# Patient Record
Sex: FEMALE | Race: WHITE
[De-identification: names, ages, dates, MRNs, and addresses within clinical notes are randomized per-mention and may not be internally consistent; named-entity substitution may affect disease eponyms.]

---

## 2018-01-11 ENCOUNTER — HOSPITAL ENCOUNTER (OUTPATIENT)
Age: 63
End: 2018-01-11
Payer: MEDICARE

## 2018-01-11 DIAGNOSIS — D50.9: ICD-10-CM

## 2018-01-11 DIAGNOSIS — E78.5: Primary | ICD-10-CM

## 2018-01-11 DIAGNOSIS — N18.2: ICD-10-CM

## 2018-01-11 LAB
ALBUMIN LEVEL: 3.6 GM/DL (ref 3.4–5)
ALBUMIN/GLOB SERPL: 1.1 {RATIO} (ref 1.1–1.8)
ALP ISO SERPL-ACNC: 62 U/L (ref 46–116)
ALT SERPLBLD-CCNC: 67 U/L (ref 12–78)
ANION GAP SERPL CALC-SCNC: 12.5 MEQ/L (ref 5–15)
AST SERPL QL: 64 U/L (ref 15–37)
BILIRUBIN,TOTAL: 0.3 MG/DL (ref 0.2–1)
BUN SERPL-MCNC: 13 MG/DL (ref 7–18)
CALCIUM SPEC-MCNC: 8.8 MG/DL (ref 8.5–10.1)
CHLORIDE SPEC-SCNC: 108 MMOL/L (ref 98–107)
CHOLEST SPEC-SCNC: 234 MG/DL (ref 140–200)
CO2 SERPL-SCNC: 28 MMOL/L (ref 21–32)
CREAT BLD-SCNC: 0.96 MG/DL (ref 0.55–1.02)
ESTIMATED GLOMERULAR FILT RATE: 59 ML/MIN (ref 60–?)
GFR (AFRICAN AMERICAN): 71 ML/MIN (ref 60–?)
GLOBULIN SER CALC-MCNC: 3.3 GM/DL (ref 1.3–3.2)
GLUCOSE: 134 MG/DL (ref 74–106)
HCT VFR BLD CALC: 36.9 % (ref 37–47)
HDLC SERPL-MCNC: 47 MG/DL (ref 29–89)
HGB BLD-MCNC: 10.9 G/DL (ref 12.2–16.2)
MCHC RBC-ENTMCNC: 29.5 G/DL (ref 31.8–35.4)
MCV RBC: 81.8 FL (ref 81–99)
MEAN CORPUSCULAR HEMOGLOBIN: 24.1 PG (ref 27–31.2)
PLATELET # BLD: 238 K/MM3 (ref 142–424)
POTASSIUM: 4.5 MMOL/L (ref 3.5–5.1)
PROT SERPL-MCNC: 6.9 GM/DL (ref 6.4–8.2)
RBC # BLD AUTO: 4.51 M/MM3 (ref 4.2–5.4)
SODIUM SPEC-SCNC: 144 MMOL/L (ref 136–145)
TRIGLYCERIDES: 108 MG/DL (ref 30–200)
WBC # BLD AUTO: 3.7 K/MM3 (ref 4.8–10.8)

## 2018-01-11 PROCEDURE — 80061 LIPID PANEL: CPT

## 2018-01-11 PROCEDURE — 80053 COMPREHEN METABOLIC PANEL: CPT

## 2018-01-11 PROCEDURE — 85025 COMPLETE CBC W/AUTO DIFF WBC: CPT

## 2018-01-11 PROCEDURE — 36415 COLL VENOUS BLD VENIPUNCTURE: CPT

## 2018-02-02 ENCOUNTER — HOSPITAL ENCOUNTER (OUTPATIENT)
Dept: HOSPITAL 22 - ACC | Age: 63
Discharge: HOME | End: 2018-02-02
Payer: MEDICARE

## 2018-02-02 VITALS — BODY MASS INDEX: 34.7 KG/M2

## 2018-02-02 DIAGNOSIS — Z79.01: Primary | ICD-10-CM

## 2018-02-02 DIAGNOSIS — Z51.81: ICD-10-CM

## 2018-02-02 LAB
INR PPP: 13.53 (ref 0.9–1.1)
PT BLD: 150 SECONDS (ref 9.4–11.8)

## 2018-02-02 PROCEDURE — 96372 THER/PROPH/DIAG INJ SC/IM: CPT

## 2018-02-02 PROCEDURE — 85610 PROTHROMBIN TIME: CPT

## 2018-02-02 PROCEDURE — 36415 COLL VENOUS BLD VENIPUNCTURE: CPT

## 2018-02-05 ENCOUNTER — HOSPITAL ENCOUNTER (OUTPATIENT)
Dept: HOSPITAL 22 - ACC | Age: 63
Discharge: HOME | End: 2018-02-05
Payer: MEDICARE

## 2018-02-05 DIAGNOSIS — Z51.81: ICD-10-CM

## 2018-02-05 DIAGNOSIS — Z79.01: Primary | ICD-10-CM

## 2018-02-05 LAB — PHA INR FINGERSTICK: 1.6 (ref 0.9–1.1)

## 2018-02-05 PROCEDURE — 85610 PROTHROMBIN TIME: CPT

## 2018-02-05 PROCEDURE — 99211 OFF/OP EST MAY X REQ PHY/QHP: CPT

## 2018-02-05 PROCEDURE — G0463 HOSPITAL OUTPT CLINIC VISIT: HCPCS

## 2018-06-12 ENCOUNTER — HOSPITAL ENCOUNTER (OUTPATIENT)
Age: 63
End: 2018-06-12
Payer: MEDICARE

## 2018-06-12 DIAGNOSIS — I10: ICD-10-CM

## 2018-06-12 DIAGNOSIS — R40.0: ICD-10-CM

## 2018-06-12 DIAGNOSIS — G47.33: Primary | ICD-10-CM

## 2018-06-12 DIAGNOSIS — E66.9: ICD-10-CM

## 2018-06-12 PROCEDURE — 95810 POLYSOM 6/> YRS 4/> PARAM: CPT

## 2018-08-02 ENCOUNTER — HOSPITAL ENCOUNTER (OUTPATIENT)
Dept: HOSPITAL 22 - ACC | Age: 63
Discharge: HOME | End: 2018-08-02
Payer: MEDICARE

## 2018-08-02 DIAGNOSIS — Z51.81: ICD-10-CM

## 2018-08-02 DIAGNOSIS — Z79.01: Primary | ICD-10-CM

## 2018-08-02 LAB — PHA INR FINGERSTICK: 2.1 (ref 0.9–1.1)

## 2018-08-02 PROCEDURE — 85610 PROTHROMBIN TIME: CPT

## 2018-08-02 PROCEDURE — G0463 HOSPITAL OUTPT CLINIC VISIT: HCPCS

## 2018-08-02 PROCEDURE — 99211 OFF/OP EST MAY X REQ PHY/QHP: CPT

## 2018-08-29 ENCOUNTER — HOSPITAL ENCOUNTER (OUTPATIENT)
Dept: HOSPITAL 22 - ACC | Age: 63
Discharge: HOME | End: 2018-08-29
Payer: MEDICARE

## 2018-08-29 DIAGNOSIS — Z79.01: Primary | ICD-10-CM

## 2018-08-29 LAB — PHA INR FINGERSTICK: 2.2 (ref 0.9–1.1)

## 2018-08-29 PROCEDURE — G0463 HOSPITAL OUTPT CLINIC VISIT: HCPCS

## 2018-08-29 PROCEDURE — 85610 PROTHROMBIN TIME: CPT

## 2018-08-29 PROCEDURE — 99211 OFF/OP EST MAY X REQ PHY/QHP: CPT

## 2018-09-12 ENCOUNTER — HOSPITAL ENCOUNTER (OUTPATIENT)
Age: 63
End: 2018-09-12
Payer: MEDICARE

## 2018-09-12 DIAGNOSIS — E11.22: Primary | ICD-10-CM

## 2018-09-12 DIAGNOSIS — E53.8: ICD-10-CM

## 2018-09-12 DIAGNOSIS — D50.9: ICD-10-CM

## 2018-09-12 DIAGNOSIS — E78.5: ICD-10-CM

## 2018-09-12 DIAGNOSIS — N18.2: ICD-10-CM

## 2018-09-12 LAB
ALBUMIN LEVEL: 3.6 GM/DL (ref 3.4–5)
ALBUMIN/GLOB SERPL: 1.1 {RATIO} (ref 1.1–1.8)
ALP ISO SERPL-ACNC: 73 U/L (ref 46–116)
ALT SERPLBLD-CCNC: 73 U/L (ref 12–78)
ANION GAP SERPL CALC-SCNC: 13.7 MEQ/L (ref 5–15)
AST SERPL QL: 63 U/L (ref 15–37)
BILIRUBIN,TOTAL: 0.2 MG/DL (ref 0.2–1)
BUN SERPL-MCNC: 12 MG/DL (ref 7–18)
CALCIUM SPEC-MCNC: 9 MG/DL (ref 8.5–10.1)
CHLORIDE SPEC-SCNC: 109 MMOL/L (ref 98–107)
CHOLEST SPEC-SCNC: 225 MG/DL (ref 140–200)
CO2 SERPL-SCNC: 26 MMOL/L (ref 21–32)
CREAT BLD-SCNC: 1.09 MG/DL (ref 0.55–1.02)
ESTIMATED GLOMERULAR FILT RATE: 51 ML/MIN (ref 60–?)
FERRITIN SERPL-MCNC: 17 NG/ML (ref 8–388)
GFR (AFRICAN AMERICAN): 61 ML/MIN (ref 60–?)
GLOBULIN SER CALC-MCNC: 3.4 GM/DL (ref 1.3–3.2)
GLUCOSE: 162 MG/DL (ref 74–106)
HBA1C MFR BLD: 7.9 % (ref 0–7)
HCT VFR BLD CALC: 37.5 % (ref 37–47)
HDLC SERPL-MCNC: 43 MG/DL (ref 29–89)
HGB BLD-MCNC: 11.5 G/DL (ref 12.2–16.2)
MCHC RBC-ENTMCNC: 30.5 G/DL (ref 31.8–35.4)
MCV RBC: 80.9 FL (ref 81–99)
MEAN CORPUSCULAR HEMOGLOBIN: 24.7 PG (ref 27–31.2)
PLATELET # BLD: 247 K/MM3 (ref 142–424)
POTASSIUM: 4.7 MMOL/L (ref 3.5–5.1)
PROT SERPL-MCNC: 7 GM/DL (ref 6.4–8.2)
RBC # BLD AUTO: 4.64 M/MM3 (ref 4.2–5.4)
SODIUM SPEC-SCNC: 144 MMOL/L (ref 136–145)
TRIGLYCERIDES: 129 MG/DL (ref 30–200)
WBC # BLD AUTO: 4.2 K/MM3 (ref 4.8–10.8)

## 2018-09-12 PROCEDURE — 82652 VIT D 1 25-DIHYDROXY: CPT

## 2018-09-12 PROCEDURE — 82570 ASSAY OF URINE CREATININE: CPT

## 2018-09-12 PROCEDURE — 85025 COMPLETE CBC W/AUTO DIFF WBC: CPT

## 2018-09-12 PROCEDURE — 82043 UR ALBUMIN QUANTITATIVE: CPT

## 2018-09-12 PROCEDURE — 82607 VITAMIN B-12: CPT

## 2018-09-12 PROCEDURE — 83036 HEMOGLOBIN GLYCOSYLATED A1C: CPT

## 2018-09-12 PROCEDURE — 80053 COMPREHEN METABOLIC PANEL: CPT

## 2018-09-12 PROCEDURE — 36415 COLL VENOUS BLD VENIPUNCTURE: CPT

## 2018-09-12 PROCEDURE — 82728 ASSAY OF FERRITIN: CPT

## 2018-09-12 PROCEDURE — 80061 LIPID PANEL: CPT

## 2018-09-14 LAB — VITAMIN B12: 746 PG/ML (ref 232–1245)

## 2018-09-18 ENCOUNTER — HOSPITAL ENCOUNTER (OUTPATIENT)
Age: 63
End: 2018-09-18
Payer: MEDICARE

## 2018-09-18 DIAGNOSIS — Z78.0: ICD-10-CM

## 2018-09-18 DIAGNOSIS — J45.40: ICD-10-CM

## 2018-09-18 DIAGNOSIS — Z12.31: ICD-10-CM

## 2018-09-18 DIAGNOSIS — R13.10: ICD-10-CM

## 2018-09-18 DIAGNOSIS — Z13.820: Primary | ICD-10-CM

## 2018-09-18 PROCEDURE — 77080 DXA BONE DENSITY AXIAL: CPT

## 2018-09-18 PROCEDURE — 74247: CPT

## 2018-10-01 ENCOUNTER — HOSPITAL ENCOUNTER (OUTPATIENT)
Dept: HOSPITAL 22 - RAD | Age: 63
Discharge: HOME | End: 2018-10-01
Payer: MEDICARE

## 2018-10-01 DIAGNOSIS — Z51.81: ICD-10-CM

## 2018-10-01 DIAGNOSIS — Z12.31: Primary | ICD-10-CM

## 2018-10-01 DIAGNOSIS — Z79.01: ICD-10-CM

## 2018-10-01 LAB — PHA INR FINGERSTICK: 2.3 (ref 0.9–1.1)

## 2018-10-01 PROCEDURE — 77067 SCR MAMMO BI INCL CAD: CPT

## 2018-10-01 PROCEDURE — 85610 PROTHROMBIN TIME: CPT

## 2018-10-01 PROCEDURE — G0463 HOSPITAL OUTPT CLINIC VISIT: HCPCS

## 2018-10-01 PROCEDURE — 99211 OFF/OP EST MAY X REQ PHY/QHP: CPT

## 2018-10-29 ENCOUNTER — HOSPITAL ENCOUNTER (OUTPATIENT)
Dept: HOSPITAL 22 - ACC | Age: 63
Discharge: HOME | End: 2018-10-29
Payer: MEDICARE

## 2018-10-29 DIAGNOSIS — Z79.01: ICD-10-CM

## 2018-10-29 DIAGNOSIS — Z86.718: ICD-10-CM

## 2018-10-29 DIAGNOSIS — Z51.81: Primary | ICD-10-CM

## 2018-10-29 LAB — PHA INR FINGERSTICK: 2 (ref 0.9–1.1)

## 2018-10-29 PROCEDURE — 99211 OFF/OP EST MAY X REQ PHY/QHP: CPT

## 2018-10-29 PROCEDURE — G0463 HOSPITAL OUTPT CLINIC VISIT: HCPCS

## 2018-10-29 PROCEDURE — 85610 PROTHROMBIN TIME: CPT

## 2018-12-10 ENCOUNTER — HOSPITAL ENCOUNTER (OUTPATIENT)
Dept: HOSPITAL 22 - ACC | Age: 63
Discharge: HOME | End: 2018-12-10
Payer: MEDICARE

## 2018-12-10 DIAGNOSIS — I82.409: ICD-10-CM

## 2018-12-10 DIAGNOSIS — Z79.01: ICD-10-CM

## 2018-12-10 DIAGNOSIS — Z51.81: Primary | ICD-10-CM

## 2018-12-10 LAB — PHA INR FINGERSTICK: 1.9 (ref 0.9–1.1)

## 2018-12-10 PROCEDURE — 85610 PROTHROMBIN TIME: CPT

## 2018-12-10 PROCEDURE — G0463 HOSPITAL OUTPT CLINIC VISIT: HCPCS

## 2018-12-10 PROCEDURE — 99211 OFF/OP EST MAY X REQ PHY/QHP: CPT

## 2018-12-18 ENCOUNTER — HOSPITAL ENCOUNTER (OUTPATIENT)
Age: 63
End: 2018-12-18
Payer: MEDICARE

## 2018-12-18 DIAGNOSIS — J40: Primary | ICD-10-CM

## 2018-12-18 PROCEDURE — 71046 X-RAY EXAM CHEST 2 VIEWS: CPT

## 2019-01-07 ENCOUNTER — HOSPITAL ENCOUNTER (OUTPATIENT)
Age: 64
End: 2019-01-07
Payer: MEDICARE

## 2019-01-07 DIAGNOSIS — Z51.81: Primary | ICD-10-CM

## 2019-01-07 DIAGNOSIS — D50.9: ICD-10-CM

## 2019-01-07 DIAGNOSIS — I82.403: ICD-10-CM

## 2019-01-07 DIAGNOSIS — E55.9: ICD-10-CM

## 2019-01-07 DIAGNOSIS — Z79.01: ICD-10-CM

## 2019-01-07 DIAGNOSIS — E11.22: ICD-10-CM

## 2019-01-07 DIAGNOSIS — E53.8: ICD-10-CM

## 2019-01-07 LAB
ALBUMIN LEVEL: 3.7 GM/DL (ref 3.4–5)
ALBUMIN/GLOB SERPL: 1 {RATIO} (ref 1.1–1.8)
ALP ISO SERPL-ACNC: 66 U/L (ref 46–116)
ALT SERPLBLD-CCNC: 57 U/L (ref 12–78)
ANION GAP SERPL CALC-SCNC: 15.1 MEQ/L (ref 5–15)
AST SERPL QL: 46 U/L (ref 15–37)
BILIRUBIN,TOTAL: 0.3 MG/DL (ref 0.2–1)
BUN SERPL-MCNC: 19 MG/DL (ref 7–18)
CALCIUM SPEC-MCNC: 9.1 MG/DL (ref 8.5–10.1)
CHLORIDE SPEC-SCNC: 103 MMOL/L (ref 98–107)
CO2 SERPL-SCNC: 28 MMOL/L (ref 21–32)
CREAT BLD-SCNC: 1.02 MG/DL (ref 0.55–1.02)
ESTIMATED GLOMERULAR FILT RATE: 55 ML/MIN (ref 60–?)
FERRITIN SERPL-MCNC: 20 NG/ML (ref 8–388)
GFR (AFRICAN AMERICAN): 66 ML/MIN (ref 60–?)
GLOBULIN SER CALC-MCNC: 3.6 GM/DL (ref 1.3–3.2)
GLUCOSE: 90 MG/DL (ref 74–106)
HBA1C MFR BLD: 7.3 % (ref 0–7)
HCT VFR BLD CALC: 39.8 % (ref 37–47)
HGB BLD-MCNC: 12.2 G/DL (ref 12.2–16.2)
INR PPP: 1.37 (ref 0.9–1.1)
MCHC RBC-ENTMCNC: 30.7 G/DL (ref 31.8–35.4)
MCV RBC: 83 FL (ref 81–99)
MEAN CORPUSCULAR HEMOGLOBIN: 25.5 PG (ref 27–31.2)
PLATELET # BLD: 265 K/MM3 (ref 142–424)
POTASSIUM: 5.1 MMOL/L (ref 3.5–5.1)
PROT SERPL-MCNC: 7.3 GM/DL (ref 6.4–8.2)
PT BLD: 14 SECONDS (ref 9.4–11.8)
RBC # BLD AUTO: 4.79 M/MM3 (ref 4.2–5.4)
SODIUM SPEC-SCNC: 141 MMOL/L (ref 136–145)
WBC # BLD AUTO: 6.2 K/MM3 (ref 4.8–10.8)

## 2019-01-07 PROCEDURE — 82043 UR ALBUMIN QUANTITATIVE: CPT

## 2019-01-07 PROCEDURE — 85610 PROTHROMBIN TIME: CPT

## 2019-01-07 PROCEDURE — 80053 COMPREHEN METABOLIC PANEL: CPT

## 2019-01-07 PROCEDURE — 85025 COMPLETE CBC W/AUTO DIFF WBC: CPT

## 2019-01-07 PROCEDURE — 83036 HEMOGLOBIN GLYCOSYLATED A1C: CPT

## 2019-01-07 PROCEDURE — 82607 VITAMIN B-12: CPT

## 2019-01-07 PROCEDURE — 36415 COLL VENOUS BLD VENIPUNCTURE: CPT

## 2019-01-07 PROCEDURE — 82570 ASSAY OF URINE CREATININE: CPT

## 2019-01-07 PROCEDURE — 82728 ASSAY OF FERRITIN: CPT

## 2019-01-07 PROCEDURE — 82652 VIT D 1 25-DIHYDROXY: CPT

## 2019-01-08 LAB — VITAMIN B12: 1172 PG/ML (ref 232–1245)

## 2019-01-21 ENCOUNTER — HOSPITAL ENCOUNTER (OUTPATIENT)
Dept: HOSPITAL 22 - ACC | Age: 64
Discharge: HOME | End: 2019-01-21
Payer: MEDICARE

## 2019-01-21 DIAGNOSIS — Z79.01: ICD-10-CM

## 2019-01-21 DIAGNOSIS — Z51.81: Primary | ICD-10-CM

## 2019-01-21 LAB — PHA INR FINGERSTICK: 2.1 (ref 0.9–1.1)

## 2019-01-21 PROCEDURE — G0463 HOSPITAL OUTPT CLINIC VISIT: HCPCS

## 2019-01-21 PROCEDURE — 85610 PROTHROMBIN TIME: CPT

## 2019-01-21 PROCEDURE — 99211 OFF/OP EST MAY X REQ PHY/QHP: CPT

## 2019-03-04 ENCOUNTER — HOSPITAL ENCOUNTER (OUTPATIENT)
Dept: HOSPITAL 22 - ACC | Age: 64
Discharge: HOME | End: 2019-03-04
Payer: MEDICARE

## 2019-03-04 DIAGNOSIS — Z79.01: ICD-10-CM

## 2019-03-04 DIAGNOSIS — Z51.81: Primary | ICD-10-CM

## 2019-03-04 DIAGNOSIS — I82.409: ICD-10-CM

## 2019-03-04 LAB — PHA INR FINGERSTICK: 2.2 (ref 0.9–1.1)

## 2019-03-04 PROCEDURE — G0463 HOSPITAL OUTPT CLINIC VISIT: HCPCS

## 2019-03-04 PROCEDURE — 99211 OFF/OP EST MAY X REQ PHY/QHP: CPT

## 2019-03-04 PROCEDURE — 85610 PROTHROMBIN TIME: CPT

## 2019-04-09 ENCOUNTER — OFFICE VISIT (OUTPATIENT)
Dept: RETAIL CLINIC | Facility: CLINIC | Age: 64
End: 2019-04-09

## 2019-04-09 VITALS
TEMPERATURE: 96.9 F | DIASTOLIC BLOOD PRESSURE: 62 MMHG | OXYGEN SATURATION: 97 % | WEIGHT: 218 LBS | RESPIRATION RATE: 18 BRPM | SYSTOLIC BLOOD PRESSURE: 108 MMHG | HEART RATE: 90 BPM

## 2019-04-09 DIAGNOSIS — R30.0 DYSURIA: Primary | ICD-10-CM

## 2019-04-09 LAB
BILIRUB BLD-MCNC: NEGATIVE MG/DL
CLARITY, POC: CLEAR
COLOR UR: YELLOW
GLUCOSE UR STRIP-MCNC: NEGATIVE MG/DL
KETONES UR QL: ABNORMAL
LEUKOCYTE EST, POC: NEGATIVE
NITRITE UR-MCNC: NEGATIVE MG/ML
PH UR: 6 [PH] (ref 5–8)
PROT UR STRIP-MCNC: ABNORMAL MG/DL
RBC # UR STRIP: NEGATIVE /UL
SP GR UR: 1.02 (ref 1–1.03)
UROBILINOGEN UR QL: NORMAL

## 2019-04-09 PROCEDURE — 81003 URINALYSIS AUTO W/O SCOPE: CPT | Performed by: NURSE PRACTITIONER

## 2019-04-09 PROCEDURE — 99203 OFFICE O/P NEW LOW 30 MIN: CPT | Performed by: NURSE PRACTITIONER

## 2019-04-09 RX ORDER — NITROFURANTOIN 25; 75 MG/1; MG/1
100 CAPSULE ORAL 2 TIMES DAILY
Qty: 14 CAPSULE | Refills: 0 | Status: SHIPPED | OUTPATIENT
Start: 2019-04-09 | End: 2019-04-16

## 2019-04-09 RX ORDER — METOPROLOL SUCCINATE 25 MG/1
TABLET, EXTENDED RELEASE ORAL
COMMUNITY
Start: 2018-07-30

## 2019-04-09 RX ORDER — CITALOPRAM 40 MG/1
TABLET ORAL
COMMUNITY
Start: 2018-08-26 | End: 2019-07-11 | Stop reason: SDUPTHER

## 2019-04-09 RX ORDER — NYSTATIN 100000 U/G
CREAM TOPICAL
COMMUNITY
Start: 2018-09-06 | End: 2021-06-04

## 2019-04-09 RX ORDER — MONTELUKAST SODIUM 10 MG/1
TABLET ORAL
COMMUNITY
Start: 2018-08-26

## 2019-04-09 RX ORDER — BUDESONIDE AND FORMOTEROL FUMARATE DIHYDRATE 160; 4.5 UG/1; UG/1
AEROSOL RESPIRATORY (INHALATION)
COMMUNITY
Start: 2018-08-26 | End: 2021-06-04

## 2019-04-09 RX ORDER — FLUNISOLIDE 0.25 MG/ML
SOLUTION NASAL
COMMUNITY
Start: 2018-09-06

## 2019-04-09 RX ORDER — SUMATRIPTAN 100 MG/1
TABLET, FILM COATED ORAL
COMMUNITY
Start: 2014-04-10 | End: 2021-03-11

## 2019-04-09 RX ORDER — INSULIN GLARGINE 100 [IU]/ML
40 INJECTION, SOLUTION SUBCUTANEOUS DAILY
COMMUNITY
End: 2019-05-02 | Stop reason: ALTCHOICE

## 2019-04-09 RX ORDER — CETIRIZINE HYDROCHLORIDE 10 MG/1
10 TABLET ORAL DAILY
COMMUNITY
End: 2021-06-04

## 2019-04-09 RX ORDER — INSULIN LISPRO 100 [IU]/ML
15 INJECTION, SUSPENSION SUBCUTANEOUS DAILY
COMMUNITY
Start: 2018-08-03 | End: 2019-04-15 | Stop reason: ALTCHOICE

## 2019-04-09 RX ORDER — LOSARTAN POTASSIUM 50 MG/1
TABLET ORAL
Refills: 0 | COMMUNITY
Start: 2019-04-02

## 2019-04-09 RX ORDER — LANSOPRAZOLE 30 MG/1
CAPSULE, DELAYED RELEASE ORAL
COMMUNITY
Start: 2011-03-01 | End: 2019-07-30 | Stop reason: SDUPTHER

## 2019-04-09 RX ORDER — ALBUTEROL SULFATE 90 UG/1
AEROSOL, METERED RESPIRATORY (INHALATION)
COMMUNITY
Start: 2011-11-28

## 2019-04-09 NOTE — PROGRESS NOTES
Urinary Tract Infection      Subjective   Kassandra Merritt is a 63 y.o. female.     Urinary Tract Infection    This is a new problem. The current episode started yesterday. The problem occurs every urination. The quality of the pain is described as burning. There has been no fever. She is not sexually active. There is no history of pyelonephritis. Pertinent negatives include no chills, flank pain, frequency, hematuria, nausea, urgency or vomiting. Treatments tried: Increased fluids  The treatment provided mild relief. There is no history of recurrent UTIs.    Has had influenza vaccine.  See ROS.       There is no problem list on file for this patient.      Allergies   Allergen Reactions   • Benadryl Allergy [Diphenhydramine] Other (See Comments)     Hypertension; only intolerant to IV form    • Lisinopril Cough   • Penicillins Itching   • Statins Rash and Myalgia        Current Outpatient Medications on File Prior to Visit   Medication Sig Dispense Refill   • albuterol sulfate HFA (PROAIR HFA) 108 (90 Base) MCG/ACT inhaler Every six hours     • Bioflavonoid Products (VITAMIN C PLUS) 1000 MG tablet Take  by mouth.     • budesonide-formoterol (SYMBICORT) 160-4.5 MCG/ACT inhaler      • cetirizine (zyrTEC) 10 MG tablet Take 10 mg by mouth Daily.     • citalopram (CeleXA) 40 MG tablet      • Cyanocobalamin (VITAMIN B 12) 100 MCG lozenge Take  by mouth.     • flunisolide (NASALIDE) 25 MCG/ACT (0.025%) solution nasal spray flunisolide     • glucose blood test strip OneTouch Ultra Test strips     • insulin glargine (LANTUS) 100 UNIT/ML injection Inject 40 Units under the skin into the appropriate area as directed Daily.     • Insulin Lispro Prot & Lispro (HUMALOG MIX 75/25 KWIKPEN) (75-25) 100 UNIT/ML suspension pen-injector Inject 15 Units under the skin into the appropriate area as directed Daily. With largest meal     • lansoprazole (PREVACID) 30 MG capsule Daily     • losartan (COZAAR) 50 MG tablet TK 1 T PO QD FOR 10  DAYS  0   • metFORMIN (GLUCOPHAGE) 1000 MG tablet metformin     • metoprolol succinate XL (TOPROL XL) 25 MG 24 hr tablet      • montelukast (SINGULAIR) 10 MG tablet      • Multiple Vitamins-Minerals (MULTIVITAMIN ADULT PO) Take  by mouth.     • Multiple Vitamins-Minerals (VITAMIN D3 COMPLETE PO) Take  by mouth.     • nystatin (MYCOSTATIN) 131014 UNIT/GM cream      • Warfarin Sodium (COUMADIN PO) Take 9 mg by mouth Daily.     • SUMAtriptan (IMITREX) 100 MG tablet Two times a day       No current facility-administered medications on file prior to visit.        Past Medical History:   Diagnosis Date   • Acid reflux    • Allergic    • Anxiety    • Asthma    • Chronic kidney disease     II    • DVT during pregnancy, antepartum    • Elevated cholesterol    • Hypertension    • Migraines     R/T to TBI    • Tachycardia    • Traumatic brain injury (CMS/HCC)     Fall    • Type 2 diabetes mellitus (CMS/HCC)        Past Surgical History:   Procedure Laterality Date   • APPENDECTOMY     • CHOLECYSTECTOMY     • OTHER SURGICAL HISTORY      cervical stabilization with halo        Family History   Problem Relation Age of Onset   • Diabetes Mother    • COPD Mother    • Lung cancer Mother    • Diabetes Father    • Heart disease Father    • Cancer Father    • Diabetes Sister    • Diabetes Brother    • Blindness Brother         due to diabetes    • Diabetes Sister        Social History     Socioeconomic History   • Marital status:      Spouse name: Not on file   • Number of children: Not on file   • Years of education: Not on file   • Highest education level: Not on file   Tobacco Use   • Smoking status: Never Smoker   • Smokeless tobacco: Never Used   Substance and Sexual Activity   • Alcohol use: No     Frequency: Never   • Drug use: No   • Sexual activity: No     Birth control/protection: Abstinence       Travel:  No recent travel within the last 21 days outside the [place] Denies recent travel to one of the following [place]   Countries:  [place], [place], [place], or [place].  Denies contact with anyone who has traveled to one of the following West  Countries: [place], [place], [place], or [place] within the last 21 days and is known or suspected to have Ebola.  Denies having had any contact with the human remains, blood or any bodily fluids of someone who is known or suspected to have Ebola within the last 21 days.     OB History     No data available          Review of Systems   Constitutional: Negative for chills, diaphoresis and fever.   Respiratory: Negative.    Cardiovascular: Negative.    Gastrointestinal: Positive for abdominal pain (lower abd pain ). Negative for diarrhea, nausea and vomiting.   Genitourinary: Positive for dysuria. Negative for decreased urine volume, flank pain, frequency, hematuria, urgency, vaginal bleeding, vaginal discharge and vaginal pain.   Musculoskeletal: Negative for back pain.   Neurological: Negative.        /62 (BP Location: Right arm, Patient Position: Sitting, Cuff Size: Adult)   Pulse 90   Temp 96.9 °F (36.1 °C) (Temporal)   Resp 18   Wt 98.9 kg (218 lb)   LMP  (LMP Unknown)   SpO2 97%   Breastfeeding? No     Objective   Physical Exam   Constitutional: She is oriented to person, place, and time. She appears well-developed and well-nourished. She is cooperative. She does not appear ill. No distress.   HENT:   Head: Normocephalic.   Cardiovascular: Normal rate, regular rhythm and normal heart sounds.   Pulmonary/Chest: Effort normal and breath sounds normal. No stridor. She has no decreased breath sounds. She has no wheezes. She has no rhonchi. She has no rales.   Abdominal: Soft. Bowel sounds are normal. There is no hepatosplenomegaly. There is no tenderness. There is no rigidity, no rebound, no guarding and no CVA tenderness.   Neurological: She is alert and oriented to person, place, and time.   Skin: Skin is warm, dry and intact. No rash noted.   Decreased skin  debra        Assessment/Plan   Kassandra was seen today for urinary tract infection.    Diagnoses and all orders for this visit:    Dysuria  -     POC Urinalysis Dipstick, Automated  -     Urine Culture - Urine, Urine, Clean Catch; Future  -     nitrofurantoin, macrocrystal-monohydrate, (MACROBID) 100 MG capsule; Take 1 capsule by mouth 2 (Two) Times a Day for 7 days.    Start to drink plenty of water and try to empty bladder every 2-3 hours and PRN.  UA, PE findings, and VS reviewed with patient today.  If symptoms do not improve and/or worsen in the next 48 hours, okay to start the antibiotic as prescribed.  Follow up with PCP if symptoms worsen/do not improve.  Will contact number on file when urine culture becomes available.  Visit summary provided today.  Questions/concerns addressed.    Results for orders placed or performed in visit on 04/09/19   POC Urinalysis Dipstick, Automated   Result Value Ref Range    Color Yellow Yellow, Straw, Dark Yellow, Beena    Clarity, UA Clear Clear    Specific Gravity  1.025 1.005 - 1.030    pH, Urine 6.0 5.0 - 8.0    Leukocytes Negative Negative    Nitrite, UA Negative Negative    Protein, POC Trace (A) Negative mg/dL    Glucose, UA Negative Negative, 1000 mg/dL (3+) mg/dL    Ketones, UA Trace (A) Negative    Urobilinogen, UA Normal Normal    Bilirubin Negative Negative    Blood, UA Negative Negative       Return if symptoms worsen or fail to improve with PCP .

## 2019-04-09 NOTE — PATIENT INSTRUCTIONS
Dysuria  Dysuria is pain or discomfort while urinating. The pain or discomfort may be felt in the tube that carries urine out of the bladder (urethra) or in the surrounding tissue of the genitals. The pain may also be felt in the groin area, lower abdomen, and lower back. You may have to urinate frequently or have the sudden feeling that you have to urinate (urgency). Dysuria can affect both men and women, but is more common in women.  Dysuria can be caused by many different things, including:  · Urinary tract infection in women.  · Infection of the kidney or bladder.  · Kidney stones or bladder stones.  · Certain sexually transmitted infections (STIs), such as chlamydia.  · Dehydration.  · Inflammation of the vagina.  · Use of certain medicines.  · Use of certain soaps or scented products that cause irritation.    Follow these instructions at home:  Watch your dysuria for any changes. The following actions may help to reduce any discomfort you are feeling:  · Drink enough fluid to keep your urine clear or pale yellow.  · Empty your bladder often. Avoid holding urine for long periods of time.  · After a bowel movement or urination, women should cleanse from front to back, using each tissue only once.  · Empty your bladder after sexual intercourse.  · Take medicines only as directed by your health care provider.  · If you were prescribed an antibiotic medicine, finish it all even if you start to feel better.  · Avoid caffeine, tea, and alcohol. They can irritate the bladder and make dysuria worse. In men, alcohol may irritate the prostate.  · Keep all follow-up visits as directed by your health care provider. This is important.  · If you had any tests done to find the cause of dysuria, it is your responsibility to obtain your test results. Ask the lab or department performing the test when and how you will get your results. Talk with your health care provider if you have any questions about your results.    Contact a  health care provider if:  · You develop pain in your back or sides.  · You have a fever.  · You have nausea or vomiting.  · You have blood in your urine.  · You are not urinating as often as you usually do.  Get help right away if:  · You pain is severe and not relieved with medicines.  · You are unable to hold down any fluids.  · You or someone else notices a change in your mental function.  · You have a rapid heartbeat at rest.  · You have shaking or chills.  · You feel extremely weak.  This information is not intended to replace advice given to you by your health care provider. Make sure you discuss any questions you have with your health care provider.  Document Released: 09/15/2005 Document Revised: 05/25/2017 Document Reviewed: 08/13/2015  [employer] Patient Education © 2018 [employer]

## 2019-04-11 LAB
BACTERIA UR CULT: NORMAL
BACTERIA UR CULT: NORMAL

## 2019-04-12 ENCOUNTER — TELEPHONE (OUTPATIENT)
Dept: RETAIL CLINIC | Facility: CLINIC | Age: 64
End: 2019-04-12

## 2019-04-12 NOTE — TELEPHONE ENCOUNTER
Patient notified of negative urine culture result today.  States she is feeling better and has been drinking more water.  Did not start the antibiotic.  Advised her to keep FU appt with Dr. Alcala on 4/15/2019.

## 2019-04-15 ENCOUNTER — OFFICE VISIT (OUTPATIENT)
Dept: FAMILY MEDICINE CLINIC | Facility: CLINIC | Age: 64
End: 2019-04-15

## 2019-04-15 VITALS
HEIGHT: 66 IN | HEART RATE: 82 BPM | BODY MASS INDEX: 35.03 KG/M2 | DIASTOLIC BLOOD PRESSURE: 68 MMHG | SYSTOLIC BLOOD PRESSURE: 122 MMHG | OXYGEN SATURATION: 98 % | RESPIRATION RATE: 14 BRPM | WEIGHT: 218 LBS

## 2019-04-15 DIAGNOSIS — I10 ESSENTIAL HYPERTENSION: ICD-10-CM

## 2019-04-15 DIAGNOSIS — I82.403 RECURRENT ACUTE DEEP VEIN THROMBOSIS (DVT) OF BOTH LOWER EXTREMITIES (HCC): ICD-10-CM

## 2019-04-15 DIAGNOSIS — F51.01 PRIMARY INSOMNIA: ICD-10-CM

## 2019-04-15 DIAGNOSIS — D68.318 COAGULATION DISORDER DUE TO CIRCULATING ANTICOAGULANTS (HCC): ICD-10-CM

## 2019-04-15 DIAGNOSIS — I47.9 PAROXYSMAL TACHYCARDIA (HCC): ICD-10-CM

## 2019-04-15 DIAGNOSIS — E55.9 VITAMIN D DEFICIENCY: ICD-10-CM

## 2019-04-15 DIAGNOSIS — E11.9 TYPE 2 DIABETES MELLITUS TREATED WITH INSULIN (HCC): ICD-10-CM

## 2019-04-15 DIAGNOSIS — E53.8 VITAMIN B12 DEFICIENCY: ICD-10-CM

## 2019-04-15 DIAGNOSIS — Z79.4 TYPE 2 DIABETES MELLITUS TREATED WITH INSULIN (HCC): ICD-10-CM

## 2019-04-15 DIAGNOSIS — Z00.00 ROUTINE GENERAL MEDICAL EXAMINATION AT A HEALTH CARE FACILITY: Primary | ICD-10-CM

## 2019-04-15 DIAGNOSIS — K21.00 GERD WITH ESOPHAGITIS: ICD-10-CM

## 2019-04-15 PROCEDURE — 99214 OFFICE O/P EST MOD 30 MIN: CPT | Performed by: FAMILY MEDICINE

## 2019-04-15 RX ORDER — DIAZEPAM 5 MG/1
5 TABLET ORAL NIGHTLY PRN
Start: 2019-04-15

## 2019-04-18 ENCOUNTER — TELEPHONE (OUTPATIENT)
Dept: FAMILY MEDICINE CLINIC | Facility: CLINIC | Age: 64
End: 2019-04-18

## 2019-04-18 NOTE — TELEPHONE ENCOUNTER
Patient called stating she is not going to be able to afford the Levemir as it is going to cost her $109 a month.

## 2019-04-29 ENCOUNTER — TELEPHONE (OUTPATIENT)
Dept: FAMILY MEDICINE CLINIC | Facility: CLINIC | Age: 64
End: 2019-04-29

## 2019-05-13 ENCOUNTER — OFFICE VISIT (OUTPATIENT)
Dept: FAMILY MEDICINE CLINIC | Facility: CLINIC | Age: 64
End: 2019-05-13

## 2019-05-13 ENCOUNTER — ANTICOAGULATION VISIT (OUTPATIENT)
Dept: FAMILY MEDICINE CLINIC | Facility: CLINIC | Age: 64
End: 2019-05-13

## 2019-05-13 VITALS
HEIGHT: 66 IN | HEART RATE: 83 BPM | RESPIRATION RATE: 14 BRPM | BODY MASS INDEX: 35.2 KG/M2 | WEIGHT: 219 LBS | OXYGEN SATURATION: 98 % | SYSTOLIC BLOOD PRESSURE: 120 MMHG | DIASTOLIC BLOOD PRESSURE: 64 MMHG

## 2019-05-13 DIAGNOSIS — D68.318 COAGULATION DISORDER DUE TO CIRCULATING ANTICOAGULANTS (HCC): ICD-10-CM

## 2019-05-13 DIAGNOSIS — I10 ESSENTIAL HYPERTENSION: ICD-10-CM

## 2019-05-13 DIAGNOSIS — Z13.1 SCREENING FOR DIABETES MELLITUS (DM): ICD-10-CM

## 2019-05-13 DIAGNOSIS — Z79.01 ANTICOAGULATION MANAGEMENT ENCOUNTER: Primary | ICD-10-CM

## 2019-05-13 DIAGNOSIS — Z79.4 TYPE 2 DIABETES MELLITUS TREATED WITH INSULIN (HCC): ICD-10-CM

## 2019-05-13 DIAGNOSIS — Z51.81 ANTICOAGULATION MANAGEMENT ENCOUNTER: Primary | ICD-10-CM

## 2019-05-13 DIAGNOSIS — Z00.00 ROUTINE GENERAL MEDICAL EXAMINATION AT HEALTH CARE FACILITY: Primary | ICD-10-CM

## 2019-05-13 DIAGNOSIS — K21.00 GERD WITH ESOPHAGITIS: ICD-10-CM

## 2019-05-13 DIAGNOSIS — E11.9 TYPE 2 DIABETES MELLITUS TREATED WITH INSULIN (HCC): ICD-10-CM

## 2019-05-13 LAB — INR PPP: 2 (ref 2–3)

## 2019-05-13 PROCEDURE — 83036 HEMOGLOBIN GLYCOSYLATED A1C: CPT | Performed by: FAMILY MEDICINE

## 2019-05-13 PROCEDURE — G0438 PPPS, INITIAL VISIT: HCPCS | Performed by: FAMILY MEDICINE

## 2019-05-13 PROCEDURE — 85610 PROTHROMBIN TIME: CPT | Performed by: FAMILY MEDICINE

## 2019-05-13 NOTE — PROGRESS NOTES
QUICK REFERENCE INFORMATION:  The ABCs of the Annual Wellness Visit    Initial Medicare Wellness Visit    HEALTH RISK ASSESSMENT    : 1955    Recent Hospitalizations:  No hospitalization(s) within the last year..  ccc      Current Medical Providers:  Patient Care Team:  Bj Alcala DO as PCP - General (Family Medicine)        Smoking Status:  Social History     Tobacco Use   Smoking Status Never Smoker   Smokeless Tobacco Never Used       Alcohol Consumption:  Social History     Substance and Sexual Activity   Alcohol Use No   • Frequency: Never       Depression Screen:   PHQ-2/PHQ-9 Depression Screening 2019   Little interest or pleasure in doing things 0   Feeling down, depressed, or hopeless 0   Total Score 0       Health Habits and Functional and Cognitive Screening:  Functional & Cognitive Status 2019   Do you have difficulty preparing food and eating? No   Do you have difficulty bathing yourself, getting dressed or grooming yourself? No   Do you have difficulty using the toilet? No   Do you have difficulty moving around from place to place? No   Do you have trouble with steps or getting out of a bed or a chair? No   In the past year have you fallen or experienced a near fall? No   Current Diet Well Balanced Diet   Dental Exam Up to date   Eye Exam Up to date   Exercise (times per week) 2 times per week   Current Exercise Activities Include Walking   Do you need help using the phone?  No   Are you deaf or do you have serious difficulty hearing?  No   Do you need help with transportation? No   Do you need help shopping? No   Do you need help preparing meals?  No   Do you need help with housework?  No   Do you need help with laundry? No   Do you need help taking your medications? No   Do you need help managing money? No   Do you ever drive or ride in a car without wearing a seat belt? No   Have you felt unusual stress, anger or loneliness in the last month? No   Who do you live with? Alone    If you need help, do you have trouble finding someone available to you? No   Have you been bothered in the last four weeks by sexual problems? No   Do you have difficulty concentrating, remembering or making decisions? Yes           Does the patient have evidence of cognitive impairment? No    Asiprin use counseling: Does not need ASA (and currently is not on it)      Recent Lab Results:                   Age-appropriate Screening Schedule:  Refer to the list below for future screening recommendations based on patient's age, sex and/or medical conditions. Orders for these recommended tests are listed in the plan section. The patient has been provided with a written plan.    Health Maintenance   Topic Date Due   • URINE MICROALBUMIN  1955   • ZOSTER VACCINE (1 of 2) 09/11/2005   • DIABETIC FOOT EXAM  04/09/2019   • PAP SMEAR  04/09/2019   • LIPID PANEL  04/09/2019   • HEMOGLOBIN A1C  04/09/2019   • TDAP/TD VACCINES (1 - Tdap) 05/13/2020 (Originally 9/11/1974)   • PNEUMOCOCCAL VACCINE (19-64 MEDIUM RISK) (1 of 1 - PPSV23) 05/03/2021 (Originally 9/11/1974)   • INFLUENZA VACCINE  08/01/2019   • DIABETIC EYE EXAM  10/01/2019   • COLONOSCOPY  04/01/2026        Subjective   History of Present Illness    Kassandra Merritt is a 63 y.o. female who presents for an Annual Wellness Visit.    The following portions of the patient's history were reviewed and updated as appropriate: allergies, current medications, past family history, past medical history, past social history, past surgical history and problem list.    Outpatient Medications Prior to Visit   Medication Sig Dispense Refill   • albuterol sulfate HFA (PROAIR HFA) 108 (90 Base) MCG/ACT inhaler Every six hours     • Bioflavonoid Products (VITAMIN C PLUS) 1000 MG tablet Take  by mouth.     • budesonide-formoterol (SYMBICORT) 160-4.5 MCG/ACT inhaler      • cetirizine (zyrTEC) 10 MG tablet Take 10 mg by mouth Daily.     • citalopram (CeleXA) 40 MG tablet      •  Cyanocobalamin (VITAMIN B 12) 100 MCG lozenge Take  by mouth.     • diazePAM (VALIUM) 5 MG tablet Take 1 tablet by mouth At Night As Needed for Anxiety (insomnia).     • flunisolide (NASALIDE) 25 MCG/ACT (0.025%) solution nasal spray flunisolide     • glucose blood test strip OneTouch Ultra Test strips     • glucose blood test strip USE AS DIRECTED TO CHECK GLUCOSE 3 TIMES DAILY FOR DM E11.9 100 each 12   • insulin detemir (LEVEMIR) 100 UNIT/ML injection Inject 25 Units under the skin into the appropriate area as directed Daily. 5 pen 1   • lansoprazole (PREVACID) 30 MG capsule Daily     • losartan (COZAAR) 50 MG tablet TK 1 T PO QD FOR 10 DAYS  0   • metoprolol succinate XL (TOPROL XL) 25 MG 24 hr tablet      • montelukast (SINGULAIR) 10 MG tablet      • Multiple Vitamins-Minerals (MULTIVITAMIN ADULT PO) Take  by mouth.     • nystatin (MYCOSTATIN) 982794 UNIT/GM cream      • SUMAtriptan (IMITREX) 100 MG tablet Two times a day     • Warfarin Sodium (COUMADIN PO) Take 9 mg by mouth Daily.     • Multiple Vitamins-Minerals (VITAMIN D3 COMPLETE PO) Take  by mouth.       No facility-administered medications prior to visit.        Patient Active Problem List   Diagnosis   • Type 2 diabetes mellitus treated with insulin (CMS/HCC)   • Essential hypertension   • Recurrent acute deep vein thrombosis (DVT) of both lower extremities (CMS/HCC)   • GERD with esophagitis   • Vitamin D deficiency   • Vitamin B12 deficiency   • 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 (CMS/HCC)   • Primary insomnia   • Paroxysmal tachycardia (CMS/HCC)       Advance Care Planning:  Patient has an advance directive - a copy has not been provided. Have asked the patient to send this to us to add to record    Identification of Risk Factors:  Risk factors include: weight  and cardiovascular risk.    Review of Systems  1. Constitutional: Negative for fever. Negative for chills, diaphoresis, fatigue and unexpected weight change.   2. HENT: No  dysphagia; no changes to vision/hearing/smell/taste; no epistaxis  3. Eyes: Negative for redness and visual disturbance.   4. Respiratory: negative for shortness of breath. Negative for chest pain . Negative for cough and chest tightness.   5. Cardiovascular: Negative for chest pain and palpitations.   6. Gastrointestinal: Negative for abdominal distention, abdominal pain and blood in stool.   7. Endocrine: Negative for cold intolerance and heat intolerance.   8. Genitourinary: Negative for difficulty urinating, dysuria and frequency.   9. Musculoskeletal: Negative for arthralgias, back pain and myalgias.   10. Skin: Negative for color change, rash and wound.   11. Neurological: Negative for syncope, weakness and headaches.   12. Hematological: Negative for adenopathy. Does not bruise/bleed easily.   13. Psychiatric/Behavioral: Negative for confusion. The patient is not nervous/anxious.  Chronic difficulties with sleep, both falling asleep and staying asleep.      Compared to one year ago, the patient feels her physical health is the same.  Compared to one year ago, the patient feels her mental health is better.    Objective     Physical Exam   General Appearance: alert, oriented x 3, no acute distress.  Skin: warm and dry.   HEENT: Atraumatic.  pupils round and reactive to light and accommodation, oral mucosa pink and moist.  Nares patent without epistaxis.  External auditory canals are patent tympanic membranes intact.  Neck: supple, no JVD, trachea midline.  No thyromegaly  Lungs: CTA, unlabored breathing effort.  Heart: RRR, normal S1 and S2, no S3, no rub.  Abdomen: soft, non-tender, no palpable bladder, present bowel sounds to auscultation ×4.  No guarding or rigidity.  Extremities: no clubbing, cyanosis or edema.  Good range of motion actively and passively.  Symmetric muscle strength and development  Neuro: normal speech and mental status.  Cranial nerves II through XII intact.  No anosmia. DTR 2+;  "proprioception intact.  No focal motor/sensory deficits.      Vitals:    05/13/19 1035   BP: 120/64   Pulse: 83   Resp: 14   SpO2: 98%   Weight: 99.3 kg (219 lb)   Height: 167.6 cm (66\")   PainSc: 0-No pain       Patient's Body mass index is 35.35 kg/m². BMI is above normal parameters. Recommendations include: educational material, exercise counseling and nutrition counseling.      Assessment/Plan   Patient Self-Management and Personalized Health Advice  The patient has been provided with information about: diet, exercise, weight management, fall prevention and supplements and preventive services including:   · Advance directive, Diabetes screening, see lab orders, Exercise counseling provided, Nutrition counseling provided.    Visit Diagnoses:    ICD-10-CM ICD-9-CM   1. Routine general medical examination at health care facility Z00.00 V70.0   2. Type 2 diabetes mellitus treated with insulin (CMS/[facility]) E11.9 250.00    Z79.4 V58.67   3. Essential hypertension I10 401.9   4. GERD with esophagitis K21.0 530.11   5. 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 (CMS/[facility]) D68.318 286.59   6. Screening for diabetes mellitus (DM) Z13.1 V77.1       Orders Placed This Encounter   Procedures   • MicroAlbumin, Urine, Random - Urine, Clean Catch   • Comprehensive Metabolic Panel   • Hepatitis C Antibody   • POC Glycosylated Hemoglobin (Hb A1C)   • CBC w AUTO Differential     Order Specific Question:   Manual Differential     Answer:   No       Outpatient Encounter Medications as of 5/13/2019   Medication Sig Dispense Refill   • albuterol sulfate HFA (PROAIR HFA) 108 (90 Base) MCG/ACT inhaler Every six hours     • Bioflavonoid Products (VITAMIN C PLUS) 1000 MG tablet Take  by mouth.     • budesonide-formoterol (SYMBICORT) 160-4.5 MCG/ACT inhaler      • cetirizine (zyrTEC) 10 MG tablet Take 10 mg by mouth Daily.     • citalopram (CeleXA) 40 MG tablet      • Cyanocobalamin (VITAMIN B 12) 100 MCG lozenge Take  by mouth.   "   • diazePAM (VALIUM) 5 MG tablet Take 1 tablet by mouth At Night As Needed for Anxiety (insomnia).     • flunisolide (NASALIDE) 25 MCG/ACT (0.025%) solution nasal spray flunisolide     • glucose blood test strip OneTouch Ultra Test strips     • glucose blood test strip USE AS DIRECTED TO CHECK GLUCOSE 3 TIMES DAILY FOR DM E11.9 100 each 12   • insulin detemir (LEVEMIR) 100 UNIT/ML injection Inject 25 Units under the skin into the appropriate area as directed Daily. 5 pen 1   • lansoprazole (PREVACID) 30 MG capsule Daily     • losartan (COZAAR) 50 MG tablet TK 1 T PO QD FOR 10 DAYS  0   • metoprolol succinate XL (TOPROL XL) 25 MG 24 hr tablet      • montelukast (SINGULAIR) 10 MG tablet      • Multiple Vitamins-Minerals (MULTIVITAMIN ADULT PO) Take  by mouth.     • nystatin (MYCOSTATIN) 069060 UNIT/GM cream      • SUMAtriptan (IMITREX) 100 MG tablet Two times a day     • Warfarin Sodium (COUMADIN PO) Take 9 mg by mouth Daily.     • [DISCONTINUED] Multiple Vitamins-Minerals (VITAMIN D3 COMPLETE PO) Take  by mouth.       No facility-administered encounter medications on file as of 5/13/2019.        Reviewed use of high risk medication in the elderly: yes  Reviewed for potential of harmful drug interactions in the elderly: yes    Follow Up:  No Follow-up on file.     An After Visit Summary and PPPS with all of these plans were given to the patient.

## 2019-05-13 NOTE — PATIENT INSTRUCTIONS
Breast Self-Awareness  Breast self-awareness means being familiar with how your breasts look and feel. It involves checking your breasts regularly and reporting any changes to your health care provider.  Practicing breast self-awareness is important. A change in your breasts can be a sign of a serious medical problem. Being familiar with how your breasts look and feel allows you to find any problems early, when treatment is more likely to be successful. All women should practice breast self-awareness, including women who have had breast implants.  How to do a breast self-exam  One way to learn what is normal for your breasts and whether your breasts are changing is to do a breast self-exam. To do a breast self-exam:  Look for Changes    1. Remove all the clothing above your waist.  2.  front of a mirror in a room with good lighting.  3. Put your hands on your hips.  4. Push your hands firmly downward.  5. Compare your breasts in the mirror. Look for differences between them (asymmetry), such as:  ? Differences in shape.  ? Differences in size.  ? Puckers, dips, and bumps in one breast and not the other.  6. Look at each breast for changes in your skin, such as:  ? Redness.  ? Scaly areas.  7. Look for changes in your nipples, such as:  ? Discharge.  ? Bleeding.  ? Dimpling.  ? Redness.  ? A change in position.  Feel for Changes    Carefully feel your breasts for lumps and changes. It is best to do this while lying on your back on the floor and again while sitting or standing in the shower or tub with soapy water on your skin. Feel each breast in the following way:  · Place the arm on the side of the breast you are examining above your head.  · Feel your breast with the other hand.  · Start in the nipple area and make ¾ inch (2 cm) overlapping circles to feel your breast. Use the pads of your three middle fingers to do this. Apply light pressure, then medium pressure, then firm pressure. The light  pressure will allow you to feel the tissue closest to the skin. The medium pressure will allow you to feel the tissue that is a little deeper. The firm pressure will allow you to feel the tissue close to the ribs.  · Continue the overlapping circles, moving downward over the breast until you feel your ribs below your breast.  · Move one finger-width toward the center of the body. Continue to use the ¾ inch (2 cm) overlapping circles to feel your breast as you move slowly up toward your collarbone.  · Continue the up and down exam using all three pressures until you reach your armpit.    Write Down What You Find    Write down what is normal for each breast and any changes that you find. Keep a written record with breast changes or normal findings for each breast. By writing this information down, you do not need to depend only on memory for size, tenderness, or location. Write down where you are in your menstrual cycle, if you are still menstruating.  If you are having trouble noticing differences in your breasts, do not get discouraged. With time you will become more familiar with the variations in your breasts and more comfortable with the exam.  How often should I examine my breasts?  Examine your breasts every month. If you are breastfeeding, the best time to examine your breasts is after a feeding or after using a breast pump. If you menstruate, the best time to examine your breasts is 5-7 days after your period is over. During your period, your breasts are lumpier, and it may be more difficult to notice changes.  When should I see my health care provider?  See your health care provider if you notice:  · A change in shape or size of your breasts or nipples.  · A change in the skin of your breast or nipples, such as a reddened or scaly area.  · Unusual discharge from your nipples.  · A lump or thick area that was not there before.  · Pain in your breasts.  · Anything that concerns you.    This information is not  intended to replace advice given to you by your health care provider. Make sure you discuss any questions you have with your health care provider.  Document Released: 12/18/2006 Document Revised: 05/25/2017 Document Reviewed: 11/06/2016  Elsevier Interactive Patient Education © 2019 [employer]      Breast Self-Awareness  Breast self-awareness means:  · Knowing how your breasts look.  · Knowing how your breasts feel.  · Checking your breasts every month for changes.  · Telling your doctor if you notice a change in your breasts.    Breast self-awareness allows you to notice a breast problem early while it is still small.  How to do a breast self-exam  One way to learn what is normal for your breasts and to check for changes is to do a breast self-exam. To do a breast self-exam:  Look for Changes    1. Take off all the clothes above your waist.  2.  front of a mirror in a room with good lighting.  3. Put your hands on your hips.  4. Push your hands down.  5. Look at your breasts and nipples in the mirror to see if one breast or nipple looks different than the other. Check to see if:  ? The shape of one breast is different.  ? The size of one breast is different.  ? There are wrinkles, dips, and bumps in one breast and not the other.  6. Look at each breast for changes in your skin, such as:  ? Redness.  ? Scaly areas.  7. Look for changes in your nipples, such as:  ? Liquid around the nipples.  ? Bleeding.  ? Dimpling.  ? Redness.  ? A change in where the nipples are.  Feel for Changes  1. Lie on your back on the floor.  2. Feel each breast. To do this, follow these steps:  ? Pick a breast to feel.  ? Put the arm closest to that breast above your head.  ? Use your other arm to feel the nipple area of your breast. Feel the area with the pads of your three middle fingers by making small circles with your fingers. For the first [place], press lightly. For the second [place], press harder. For the third [place],  press even harder.  ? Keep making circles with your fingers at the light, harder, and even harder pressures as you move down your breast. Stop when you feel your ribs.  ? Move your fingers a little toward the center of your body.  ? Start making circles with your fingers again, this time going up until you reach your collarbone.  ? Keep making up and down circles until you reach your armpit. Remember to keep using the three pressures.  ? Feel the other breast in the same way.  3. Sit or  the shower or tub.  4. With soapy water on your skin, feel each breast the same way you did in step 2, when you were lying on the floor.  Write Down What You Find    After doing the self-exam, write down:  · What is normal for each breast.  · Any changes you find in each breast.  · When you last had your period.    How often should I check my breasts?  Check your breasts every month. If you are breastfeeding, the best time to check them is after you feed your baby or after you use a breast pump. If you get periods, the best time to check your breasts is 5-7 days after your period is over.  When should I see my doctor?  See your doctor if you notice:  · A change in shape or size of your breasts or nipples.  · A change in the skin of your breast or nipples, such as red or scaly skin.  · Unusual fluid coming from your nipples.  · A lump or thick area that was not there before.  · Pain in your breasts.  · Anything that concerns you.    This information is not intended to replace advice given to you by your health care provider. Make sure you discuss any questions you have with your health care provider.  Document Released: 06/05/2009 Document Revised: 05/25/2017 Document Reviewed: 11/06/2016  [employer] Interactive Patient Education © 2019 [employer]      Exercising to Lose Weight  Exercising can help you to lose weight. In order to lose weight through exercise, you need to do vigorous-intensity exercise. You can tell that you  are exercising with vigorous intensity if you are breathing very hard and fast and cannot hold a conversation while exercising.  Moderate-intensity exercise helps to maintain your current weight. You can tell that you are exercising at a moderate level if you have a higher heart rate and faster breathing, but you are still able to hold a conversation.  How often should I exercise?  Choose an activity that you enjoy and set realistic goals. Your health care provider can help you to make an activity plan that works for you. Exercise regularly as directed by your health care provider. This may include:  · Doing resistance training twice each week, such as:  ? Push-ups.  ? Sit-ups.  ? Lifting weights.  ? Using resistance bands.  · Doing a given intensity of exercise for a given amount of time. Choose from these options:  ? 150 minutes of moderate-intensity exercise every week.  ? 75 minutes of vigorous-intensity exercise every week.  ? A mix of moderate-intensity and vigorous-intensity exercise every week.    Children, pregnant women, people who are out of shape, people who are overweight, and older adults may need to consult a health care provider for individual recommendations. If you have any sort of medical condition, be sure to consult your health care provider before starting a new exercise program.  What are some activities that can help me to lose weight?  · Walking at a rate of at least 4.5 miles an hour.  · Jogging or running at a rate of 5 miles per hour.  · Biking at a rate of at least 10 miles per hour.  · Lap swimming.  · Roller-skating or in-line skating.  · Cross-country skiing.  · Vigorous competitive sports, such as football, basketball, and soccer.  · Jumping rope.  · Aerobic dancing.  How can I be more active in my day-to-day activities?  · Use the stairs instead of the elevator.  · Take a walk during your lunch break.  · If you drive, park your car farther away from work or school.  · If you take  public transportation, get off one stop early and walk the rest of the way.  · Make all of your phone calls while standing up and walking around.  · Get up, stretch, and walk around every 30 minutes throughout the day.  What guidelines should I follow while exercising?  · Do not exercise so much that you hurt yourself, feel dizzy, or get very short of breath.  · Consult your health care provider prior to starting a new exercise program.  · Wear comfortable clothes and shoes with good support.  · Drink plenty of water while you exercise to prevent dehydration or heat stroke. Body water is lost during exercise and must be replaced.  · Work out until you breathe faster and your heart beats faster.  This information is not intended to replace advice given to you by your health care provider. Make sure you discuss any questions you have with your health care provider.  Document Released: 01/20/2012 Document Revised: 05/25/2017 Document Reviewed: 05/21/2015  [employer] Interactive Patient Education © 2019 [employer]      Exercising to Stay Healthy  Exercising regularly is important. It has many health benefits, such as:  · Improving your overall fitness, flexibility, and endurance.  · Increasing your bone density.  · Helping with weight control.  · Decreasing your body fat.  · Increasing your muscle strength.  · Reducing stress and tension.  · Improving your overall health.    In order to become healthy and stay healthy, it is recommended that you do moderate-intensity and vigorous-intensity exercise. You can tell that you are exercising at a moderate intensity if you have a higher heart rate and faster breathing, but you are still able to hold a conversation. You can tell that you are exercising at a vigorous intensity if you are breathing much harder and faster and cannot hold a conversation while exercising.  How often should I exercise?  Choose an activity that you enjoy and set realistic goals. Your health care  provider can help you to make an activity plan that works for you. Exercise regularly as directed by your health care provider. This may include:  · Doing resistance training twice each week, such as:  ? Push-ups.  ? Sit-ups.  ? Lifting weights.  ? Using resistance bands.  · Doing a given intensity of exercise for a given amount of time. Choose from these options:  ? 150 minutes of moderate-intensity exercise every week.  ? 75 minutes of vigorous-intensity exercise every week.  ? A mix of moderate-intensity and vigorous-intensity exercise every week.    Children, pregnant women, people who are out of shape, people who are overweight, and older adults may need to consult a health care provider for individual recommendations. If you have any sort of medical condition, be sure to consult your health care provider before starting a new exercise program.  What are some exercise ideas?  Some moderate-intensity exercise ideas include:  · Walking at a rate of 1 mile in 15 minutes.  · Biking.  · Hiking.  · Golfing.  · Dancing.    Some vigorous-intensity exercise ideas include:  · Walking at a rate of at least 4.5 miles per hour.  · Jogging or running at a rate of 5 miles per hour.  · Biking at a rate of at least 10 miles per hour.  · Lap swimming.  · Roller-skating or in-line skating.  · Cross-country skiing.  · Vigorous competitive sports, such as football, basketball, and soccer.  · Jumping rope.  · Aerobic dancing.    What are some everyday activities that can help me to get exercise?  · Yard work, such as:  ? Pushing a .  ? Raking and bagging leaves.  · Washing and waxing your car.  · Pushing a stroller.  · Shoveling snow.  · Gardening.  · Washing windows or floors.  How can I be more active in my day-to-day activities?  · Use the stairs instead of the elevator.  · Take a walk during your lunch break.  · If you drive, park your car farther away from work or school.  · If you take public transportation, get off  one stop early and walk the rest of the way.  · Make all of your phone calls while standing up and walking around.  · Get up, stretch, and walk around every 30 minutes throughout the day.  What guidelines should I follow while exercising?  · Do not exercise so much that you hurt yourself, feel dizzy, or get very short of breath.  · Consult your health care provider before starting a new exercise program.  · Wear comfortable clothes and shoes with good support.  · Drink plenty of water while you exercise to prevent dehydration or heat stroke. Body water is lost during exercise and must be replaced.  · Work out until you breathe faster and your heart beats faster.  This information is not intended to replace advice given to you by your health care provider. Make sure you discuss any questions you have with your health care provider.  Document Released: 01/20/2012 Document Revised: 05/25/2017 Document Reviewed: 05/21/2015  [employer] Interactive Patient Education © 2018 [employer]      Fall Prevention in the Home, Adult  Falls can cause injuries and can affect people from all age groups. There are many simple things that you can do to make your home safe and to help prevent falls. Ask for help when making these changes, if needed.  What actions can I take to prevent falls?  General instructions  · Use good lighting in all rooms. Replace any light bulbs that burn out.  · Turn on lights if it is dark. Use night-lights.  · Place frequently used items in easy-to-reach places. Lower the shelves around your home if necessary.  · Set up furniture so that there are clear paths around it. Avoid moving your furniture around.  · Remove throw rugs and other tripping hazards from the floor.  · Avoid walking on wet floors.  · Fix any uneven floor surfaces.  · Add color or contrast paint or tape to grab bars and handrails in your home. Place contrasting color strips on the first and last steps of stairways.  · When you use a  stepladder, make sure that it is completely opened and that the sides are firmly locked. Have someone hold the ladder while you are using it. Do not climb a closed stepladder.  · Be aware of any and all pets.  What can I do in the bathroom?  · Keep the floor dry. Immediately clean up any water that spills onto the floor.  · Remove soap buildup in the tub or shower on a regular basis.  · Use non-skid mats or decals on the floor of the tub or shower.  · Attach bath mats securely with double-sided, non-slip rug tape.  · If you need to sit down while you are in the shower, use a plastic, non-slip stool.  · Install grab bars by the toilet and in the tub and shower. Do not use towel bars as grab bars.  What can I do in the bedroom?  · Make sure that a bedside light is easy to reach.  · Do not use oversized bedding that drapes onto the floor.  · Have a firm chair that has side arms to use for getting dressed.  What can I do in the kitchen?  · Clean up any spills right away.  · If you need to reach for something above you, use a sturdy step stool that has a grab bar.  · Keep electrical cables out of the way.  · Do not use floor polish or wax that makes floors slippery. If you must use wax, make sure that it is non-skid floor wax.  What can I do in the stairways?  · Do not leave any items on the stairs.  · Make sure that you have a light switch at the top of the stairs and the bottom of the stairs. Have them installed if you do not have them.  · Make sure that there are handrails on both sides of the stairs. Fix handrails that are broken or loose. Make sure that handrails are as long as the stairways.  · Install non-slip stair treads on all stairs in your home.  · Avoid having throw rugs at the top or bottom of stairways, or secure the rugs with carpet tape to prevent them from moving.  · Choose a carpet design that does not hide the edge of steps on the stairway.  · Check any carpeting to make sure that it is firmly  attached to the stairs. Fix any carpet that is loose or worn.  What can I do on the outside of my home?  · Use bright outdoor lighting.  · Regularly repair the edges of walkways and driveways and fix any cracks.  · Remove high doorway thresholds.  · Trim any shrubbery on the main path into your home.  · Regularly check that handrails are securely fastened and in good repair. Both sides of any steps should have handrails.  · Install guardrails along the edges of any raised decks or porches.  · Clear walkways of debris and clutter, including tools and rocks.  · Have leaves, snow, and ice cleared regularly.  · Use sand or salt on walkways during winter months.  · In the garage, clean up any spills right away, including grease or oil spills.  What other actions can I take?  · Wear closed-toe shoes that fit well and support your feet. Wear shoes that have rubber soles or low heels.  · Use mobility aids as needed, such as canes, walkers, scooters, and crutches.  · Review your medicines with your health care provider. Some medicines can cause dizziness or changes in blood pressure, which increase your risk of falling.  Talk with your health care provider about other ways that you can decrease your risk of falls. This may include working with a physical therapist or  to improve your strength, balance, and endurance.  Where to find more information  · Centers for Disease Control and Prevention, [place]: https://www.cdc.gov  · National [place] on Aging: https://pb4geoo.kaiser.nih.gov  Contact a health care provider if:  · You are afraid of falling at home.  · You feel weak, drowsy, or dizzy at home.  · You fall at home.  Summary  · There are many simple things that you can do to make your home safe and to help prevent falls.  · Ways to make your home safe include removing tripping hazards and installing grab bars in the bathroom.  · Ask for help when making these changes in your home.  This information is not intended to  replace advice given to you by your health care provider. Make sure you discuss any questions you have with your health care provider.  Document Released: 12/08/2003 Document Revised: 08/02/2018 Document Reviewed: 08/02/2018  [employer] Interactive Patient Education © 2019 [employer]      Fall Prevention in the Home, Adult  Falls can cause injuries. They can happen to people of all ages. There are many things you can do to make your home safe and to help prevent falls. Ask for help when making these changes, if needed.  What actions can I take to prevent falls?  General Instructions  · Use good lighting in all rooms. Replace any light bulbs that burn out.  · Turn on the lights when you go into a dark area. Use night-lights.  · Keep items that you use often in easy-to-reach places. Lower the shelves around your home if necessary.  · Set up your furniture so you have a clear path. Avoid moving your furniture around.  · Do not have throw rugs and other things on the floor that can make you trip.  · Avoid walking on wet floors.  · If any of your floors are uneven, fix them.  · Add color or contrast paint or tape to clearly moo and help you see:  ? Any grab bars or handrails.  ? First and last steps of stairways.  ? Where the edge of each step is.  · If you use a stepladder:  ? Make sure that it is fully opened. Do not climb a closed stepladder.  ? Make sure that both sides of the stepladder are locked into place.  ? Ask someone to hold the stepladder for you while you use it.  · If there are any pets around you, be aware of where they are.  What can I do in the bathroom?  · Keep the floor dry. Clean up any water that spills onto the floor as soon as it happens.  · Remove soap buildup in the tub or shower regularly.  · Use non-skid mats or decals on the floor of the tub or shower.  · Attach bath mats securely with double-sided, non-slip rug tape.  · If you need to sit down in the shower, use a plastic, non-slip  stool.  · Install grab bars by the toilet and in the tub and shower. Do not use towel bars as grab bars.  What can I do in the bedroom?  · Make sure that you have a light by your bed that is easy to reach.  · Do not use any sheets or blankets that are too big for your bed. They should not hang down onto the floor.  · Have a firm chair that has side arms. You can use this for support while you get dressed.  What can I do in the kitchen?  · Clean up any spills right away.  · If you need to reach something above you, use a strong step stool that has a grab bar.  · Keep electrical cords out of the way.  · Do not use floor polish or wax that makes floors slippery. If you must use wax, use non-skid floor wax.  What can I do with my stairs?  · Do not leave any items on the stairs.  · Make sure that you have a light switch at the top of the stairs and the bottom of the stairs. If you do not have them, ask someone to add them for you.  · Make sure that there are handrails on both sides of the stairs, and use them. Fix handrails that are broken or loose. Make sure that handrails are as long as the stairways.  · Install non-slip stair treads on all stairs in your home.  · Avoid having throw rugs at the top or bottom of the stairs. If you do have throw rugs, attach them to the floor with carpet tape.  · Choose a carpet that does not hide the edge of the steps on the stairway.  · Check any carpeting to make sure that it is firmly attached to the stairs. Fix any carpet that is loose or worn.  What can I do on the outside of my home?  · Use bright outdoor lighting.  · Regularly fix the edges of walkways and driveways and fix any cracks.  · Remove anything that might make you trip as you walk through a door, such as a raised step or threshold.  · Trim any bushes or trees on the path to your home.  · Regularly check to see if handrails are loose or broken. Make sure that both sides of any steps have handrails.  · Install guardrails  along the edges of any raised decks and porches.  · Clear walking paths of anything that might make someone trip, such as tools or rocks.  · Have any leaves, snow, or ice cleared regularly.  · Use sand or salt on walking paths during winter.  · Clean up any spills in your garage right away. This includes grease or oil spills.  What other actions can I take?  · Wear shoes that:  ? Have a low heel. Do not wear high heels.  ? Have rubber bottoms.  ? Are comfortable and fit you well.  ? Are closed at the toe. Do not wear open-toe sandals.  · Use tools that help you move around (mobility aids) if they are needed. These include:  ? Canes.  ? Walkers.  ? Scooters.  ? Crutches.  · Review your medicines with your doctor. Some medicines can make you feel dizzy. This can increase your chance of falling.  Ask your doctor what other things you can do to help prevent falls.  Where to find more information  · Centers for Disease Control and Prevention, STEAD[place]: https://cdc.gov  · [employer] [place] [employer]: https://zt3ocvu.kaiser.nih.gov  Contact a doctor if:  · You are afraid of falling at home.  · You feel weak, drowsy, or dizzy at home.  · You fall at home.  Summary  · There are many simple things that you can do to make your home safe and to help prevent falls.  · Ways to make your home safe include removing tripping hazards and installing grab bars in the bathroom.  · Ask for help when making these changes in your home.  This information is not intended to replace advice given to you by your health care provider. Make sure you discuss any questions you have with your health care provider.  Document Released: 10/14/2010 Document Revised: 08/02/2018 Document Reviewed: 08/02/2018  Elsevier Interactive Patient Education © 2019 Elsevier Inc.

## 2019-05-14 LAB
ALBUMIN SERPL-MCNC: 4.3 G/DL (ref 3.5–5)
ALBUMIN/GLOB SERPL: 1.4 G/DL (ref 1–2)
ALP SERPL-CCNC: 70 U/L (ref 38–126)
ALT SERPL-CCNC: 53 U/L (ref 13–69)
AST SERPL-CCNC: 58 U/L (ref 15–46)
BASOPHILS # BLD AUTO: 0.04 10*3/MM3 (ref 0–0.2)
BASOPHILS NFR BLD AUTO: 0.6 % (ref 0–1.5)
BILIRUB SERPL-MCNC: 0.3 MG/DL (ref 0.2–1.3)
BUN SERPL-MCNC: 18 MG/DL (ref 7–20)
BUN/CREAT SERPL: 22.5 (ref 7.1–23.5)
CALCIUM SERPL-MCNC: 9.8 MG/DL (ref 8.4–10.2)
CHLORIDE SERPL-SCNC: 102 MMOL/L (ref 98–107)
CO2 SERPL-SCNC: 26 MMOL/L (ref 26–30)
CREAT SERPL-MCNC: 0.8 MG/DL (ref 0.6–1.3)
EOSINOPHIL # BLD AUTO: 0.28 10*3/MM3 (ref 0–0.4)
EOSINOPHIL NFR BLD AUTO: 4.3 % (ref 0.3–6.2)
ERYTHROCYTE [DISTWIDTH] IN BLOOD BY AUTOMATED COUNT: 15.1 % (ref 12.3–15.4)
GLOBULIN SER CALC-MCNC: 3 GM/DL
GLUCOSE SERPL-MCNC: 164 MG/DL (ref 74–98)
HCT VFR BLD AUTO: 39.6 % (ref 34–46.6)
HGB BLD-MCNC: 12.3 G/DL (ref 12–15.9)
IMM GRANULOCYTES # BLD AUTO: 0.02 10*3/MM3 (ref 0–0.05)
IMM GRANULOCYTES NFR BLD AUTO: 0.3 % (ref 0–0.5)
LYMPHOCYTES # BLD AUTO: 1.61 10*3/MM3 (ref 0.7–3.1)
LYMPHOCYTES NFR BLD AUTO: 24.9 % (ref 19.6–45.3)
MCH RBC QN AUTO: 26.1 PG (ref 26.6–33)
MCHC RBC AUTO-ENTMCNC: 31.1 G/DL (ref 31.5–35.7)
MCV RBC AUTO: 83.9 FL (ref 79–97)
MICROALBUMIN UR-MCNC: <3 UG/ML
MONOCYTES # BLD AUTO: 0.61 10*3/MM3 (ref 0.1–0.9)
MONOCYTES NFR BLD AUTO: 9.4 % (ref 5–12)
NEUTROPHILS # BLD AUTO: 3.9 10*3/MM3 (ref 1.7–7)
NEUTROPHILS NFR BLD AUTO: 60.5 % (ref 42.7–76)
NRBC BLD AUTO-RTO: 0 /100 WBC (ref 0–0.2)
PLATELET # BLD AUTO: 271 10*3/MM3 (ref 140–450)
POTASSIUM SERPL-SCNC: 5.3 MMOL/L (ref 3.5–5.1)
PROT SERPL-MCNC: 7.3 G/DL (ref 6.3–8.2)
RBC # BLD AUTO: 4.72 10*6/MM3 (ref 3.77–5.28)
SODIUM SERPL-SCNC: 140 MMOL/L (ref 137–145)
WBC # BLD AUTO: 6.46 10*3/MM3 (ref 3.4–10.8)

## 2019-05-15 LAB
HCV AB S/CO SERPL IA: <0.1 S/CO RATIO (ref 0–0.9)
Lab: NORMAL
WRITTEN AUTHORIZATION: NORMAL

## 2019-05-21 LAB — HBA1C MFR BLD: 5.2 %

## 2019-06-12 ENCOUNTER — ANTICOAGULATION VISIT (OUTPATIENT)
Dept: FAMILY MEDICINE CLINIC | Facility: CLINIC | Age: 64
End: 2019-06-12

## 2019-06-12 ENCOUNTER — CLINICAL SUPPORT (OUTPATIENT)
Dept: FAMILY MEDICINE CLINIC | Facility: CLINIC | Age: 64
End: 2019-06-12

## 2019-06-12 DIAGNOSIS — I82.4Y3 DEEP VEIN THROMBOSIS (DVT) OF PROXIMAL VEIN OF BOTH LOWER EXTREMITIES, UNSPECIFIED CHRONICITY (HCC): Primary | ICD-10-CM

## 2019-06-12 DIAGNOSIS — I82.409 DEEP VEIN THROMBOSIS (DVT) OF LOWER EXTREMITY, UNSPECIFIED CHRONICITY, UNSPECIFIED LATERALITY, UNSPECIFIED VEIN (HCC): ICD-10-CM

## 2019-06-12 LAB — INR PPP: 2.3 (ref 0.9–1.1)

## 2019-06-12 PROCEDURE — 85610 PROTHROMBIN TIME: CPT | Performed by: FAMILY MEDICINE

## 2019-07-11 ENCOUNTER — OFFICE VISIT (OUTPATIENT)
Dept: FAMILY MEDICINE CLINIC | Facility: CLINIC | Age: 64
End: 2019-07-11

## 2019-07-11 ENCOUNTER — ANTICOAGULATION VISIT (OUTPATIENT)
Dept: FAMILY MEDICINE CLINIC | Facility: CLINIC | Age: 64
End: 2019-07-11

## 2019-07-11 VITALS
BODY MASS INDEX: 35.03 KG/M2 | HEART RATE: 70 BPM | DIASTOLIC BLOOD PRESSURE: 72 MMHG | OXYGEN SATURATION: 97 % | HEIGHT: 66 IN | SYSTOLIC BLOOD PRESSURE: 118 MMHG | WEIGHT: 218 LBS | RESPIRATION RATE: 14 BRPM

## 2019-07-11 DIAGNOSIS — E53.8 VITAMIN B12 DEFICIENCY: ICD-10-CM

## 2019-07-11 DIAGNOSIS — E11.9 TYPE 2 DIABETES MELLITUS TREATED WITH INSULIN (HCC): Primary | ICD-10-CM

## 2019-07-11 DIAGNOSIS — I82.4Y3 DEEP VEIN THROMBOSIS (DVT) OF PROXIMAL VEIN OF BOTH LOWER EXTREMITIES, UNSPECIFIED CHRONICITY (HCC): Primary | ICD-10-CM

## 2019-07-11 DIAGNOSIS — F51.01 PRIMARY INSOMNIA: ICD-10-CM

## 2019-07-11 DIAGNOSIS — D68.318 COAGULATION DISORDER DUE TO CIRCULATING ANTICOAGULANTS (HCC): ICD-10-CM

## 2019-07-11 DIAGNOSIS — E55.9 VITAMIN D DEFICIENCY: ICD-10-CM

## 2019-07-11 DIAGNOSIS — Z79.4 TYPE 2 DIABETES MELLITUS TREATED WITH INSULIN (HCC): Primary | ICD-10-CM

## 2019-07-11 DIAGNOSIS — I82.403 RECURRENT ACUTE DEEP VEIN THROMBOSIS (DVT) OF BOTH LOWER EXTREMITIES (HCC): ICD-10-CM

## 2019-07-11 DIAGNOSIS — I10 ESSENTIAL HYPERTENSION: ICD-10-CM

## 2019-07-11 DIAGNOSIS — K21.00 GERD WITH ESOPHAGITIS: ICD-10-CM

## 2019-07-11 LAB — INR PPP: 3.1 (ref 0.9–1.1)

## 2019-07-11 PROCEDURE — 85610 PROTHROMBIN TIME: CPT | Performed by: FAMILY MEDICINE

## 2019-07-11 PROCEDURE — 99214 OFFICE O/P EST MOD 30 MIN: CPT | Performed by: FAMILY MEDICINE

## 2019-07-11 RX ORDER — CITALOPRAM 20 MG/1
20 TABLET ORAL DAILY
Qty: 90 TABLET | Refills: 0 | Status: SHIPPED | OUTPATIENT
Start: 2019-07-11

## 2019-07-11 RX ORDER — WARFARIN SODIUM 3 MG/1
9 TABLET ORAL NIGHTLY
Qty: 270 TABLET | Refills: 0 | Status: SHIPPED | OUTPATIENT
Start: 2019-07-11 | End: 2019-08-27

## 2019-07-11 RX ORDER — INSULIN LISPRO 100 [IU]/ML
20 INJECTION, SUSPENSION SUBCUTANEOUS 2 TIMES DAILY WITH MEALS
Qty: 10 PEN | Refills: 1 | Status: SHIPPED | OUTPATIENT
Start: 2019-07-11

## 2019-07-15 NOTE — PROGRESS NOTES
Established Patient        Chief Complaint:   Chief Complaint   Patient presents with   • Hypertension   • Diabetes   • Anticoagulation     PT/INR in offfice today 3.1 taking 9mg daily        Kassandra Merritt is a 63 y.o. female    History of Present Illness:   Here for scheduled follow-up visit concerning her known diabetes mellitus/hypertension/anticoagulation of chronic nature due to recurrent DVT.  She denies any bright red blood or black or tarry stools.  Denies any hemoptysis or dysuria/hematuria.  Denies any rashes, fever or chills.  Denies any orthopnea/chest pain/dyspnea on exertion.  She denies any history of cyclical/persistent hypoglycemic episodes.    Subjective     The following portions of the patient's history were reviewed and updated as appropriate: allergies, current medications, past family history, past medical history, past social history, past surgical history and problem list.    Allergies   Allergen Reactions   • Benadryl Allergy [Diphenhydramine] Other (See Comments)     Hypertension; only intolerant to IV form    • Lisinopril Cough   • Penicillins Itching   • Statins Rash and Myalgia       Review of Systems  1. Constitutional: Negative for fever. Negative for chills, diaphoresis, fatigue and unexpected weight change.   2. HENT: No dysphagia; no changes to vision/hearing/smell/taste; no epistaxis  3. Eyes: Negative for redness and visual disturbance.   4. Respiratory: negative for shortness of breath. Negative for chest pain . Negative for cough and chest tightness.   5. Cardiovascular: Negative for chest pain and palpitations.   6. Gastrointestinal: Negative for abdominal distention, abdominal pain and blood in stool.   7. Endocrine: Negative for cold intolerance and heat intolerance.   8. Genitourinary: Negative for difficulty urinating, dysuria and frequency.   9. Musculoskeletal: Negative for arthralgias, back pain and myalgias.   10. Skin: Negative for color change, rash and wound.  "  11. Neurological: Negative for syncope, weakness and headaches.   12. Hematological: Negative for adenopathy. Does not bruise/bleed easily.   13. Psychiatric/Behavioral: Negative for confusion. The patient is not nervous/anxious.  Chronic difficulties with sleep, both falling asleep and staying asleep.    Objective     Physical Exam   Vital Signs: /72   Pulse 70   Resp 14   Ht 167.6 cm (66\")   Wt 98.9 kg (218 lb)   LMP  (LMP Unknown)   SpO2 97%   BMI 35.19 kg/m²     General Appearance: alert, oriented x 3, no acute distress.  Skin: warm and dry.   HEENT: Atraumatic.  pupils round and reactive to light and accommodation, oral mucosa pink and moist.  Nares patent without epistaxis.  External auditory canals are patent tympanic membranes intact.  Neck: supple, no JVD, trachea midline.  No thyromegaly  Lungs: CTA, unlabored breathing effort.  Heart: RRR, normal S1 and S2, no S3, no rub.  Abdomen: soft, non-tender, no palpable bladder, present bowel sounds to auscultation ×4.  No guarding or rigidity.  Extremities: no clubbing, cyanosis or edema.  Good range of motion actively and passively.  Symmetric muscle strength and development  Neuro: normal speech and mental status.  Cranial nerves II through XII intact.  No anosmia. DTR 2+; proprioception intact.  No focal motor/sensory deficits.    Assessment and Plan      Assessment:   Kassandra was seen today for hypertension, diabetes and anticoagulation.    Diagnoses and all orders for this visit:    Type 2 diabetes mellitus treated with insulin (CMS/[facility])  -     Insulin Glargine (LANTUS SOLOSTAR) 100 UNIT/ML injection pen; Inject 40 Units under the skin into the appropriate area as directed Every Night.  -     Insulin Lispro Prot & Lispro (HUMALOG MIX 75/25 KWIKPEN) (75-25) 100 UNIT/ML suspension pen-injector pen; Inject 20 Units under the skin into the appropriate area as directed 2 (Two) Times a Day With Meals.    Essential hypertension    Recurrent acute deep " vein thrombosis (DVT) of both lower extremities (CMS/HCC)  -     warfarin (COUMADIN) 3 MG tablet; Take 3 tablets by mouth Every Night.    GERD with esophagitis    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 (CMS/HCC)  -     warfarin (COUMADIN) 3 MG tablet; Take 3 tablets by mouth Every Night.    Primary insomnia    Vitamin D deficiency    Vitamin B12 deficiency    Other orders  -     citalopram (CeleXA) 20 MG tablet; Take 1 tablet by mouth Daily.        Plan:  Blood pressure is at goal.  Continue ARB.  Continue current beta-blocker dosing.    Continue vitamin B12 supplementation.    Hemoglobin A1c is at goal, excellent control of her blood glucose.  Continue avoidance of prolonged fasting periods, maintain adequate hydration.  Continue current insulin dosing and regimen.    Continue current anticoagulation, INR is therapeutic.    Continue PPI.  Discussion Summary:  Anti - reflux measures, trigger foods and drinks to avoid: Fatty foods, alcohol, chocolate, coffee, tea, caffeinated soft drinks (decaffeinated coffee still has some caffeine), peppermint and spearmint, spices and vinegar, citrus fruits and juices, tomatoes and tomato sauces, and smoking. Other antireflux measures include weight reduction if overweight, avoid tight clothing around the abdomen, elevate the head of her bed 6 inches (May use a bed wedge which is placed between the mattress in box [place]) or blocks under the head of the bed, don't drink or eat for 2 hours before going to bed and avoid lying down immediately after meals.    Discussed need for reduction in sodium/salt/caffeine intake; improve sleep habits as able; inc formal CV exercise program with goal of vigorous activity most, if not all, days of the week; goal of 50 min of sustained HR CV exercise.    Discussed plan of care in detail with pt today; pt verb understanding and agrees.  Follow up:  Return in about 3 months (around 10/11/2019) for Recheck, Med Change/New Meds.      There are no Patient Instructions on file for this visit.    Bj Alcala,   07/15/19  8:07 AM    Please note that portions of this note may have been completed with a voice recognition program. Efforts were made to edit the dictations, but occasionally words are mistranscribed.

## 2019-07-30 RX ORDER — LANSOPRAZOLE 30 MG/1
30 CAPSULE, DELAYED RELEASE ORAL 2 TIMES DAILY
Qty: 60 CAPSULE | Refills: 0 | Status: SHIPPED | OUTPATIENT
Start: 2019-07-30 | End: 2019-07-30 | Stop reason: SDUPTHER

## 2019-07-30 RX ORDER — LANSOPRAZOLE 30 MG/1
30 CAPSULE, DELAYED RELEASE ORAL 2 TIMES DAILY
Qty: 180 CAPSULE | Refills: 3 | Status: SHIPPED | OUTPATIENT
Start: 2019-07-30

## 2019-08-09 ENCOUNTER — ANTICOAGULATION VISIT (OUTPATIENT)
Dept: FAMILY MEDICINE CLINIC | Facility: CLINIC | Age: 64
End: 2019-08-09

## 2019-08-09 DIAGNOSIS — I82.4Y3 DEEP VEIN THROMBOSIS (DVT) OF PROXIMAL VEIN OF BOTH LOWER EXTREMITIES, UNSPECIFIED CHRONICITY (HCC): Primary | ICD-10-CM

## 2019-08-09 LAB — INR PPP: 3.3 (ref 2–3)

## 2019-08-09 PROCEDURE — 85610 PROTHROMBIN TIME: CPT | Performed by: FAMILY MEDICINE

## 2019-08-26 ENCOUNTER — ANTICOAGULATION VISIT (OUTPATIENT)
Dept: FAMILY MEDICINE CLINIC | Facility: CLINIC | Age: 64
End: 2019-08-26

## 2019-08-26 DIAGNOSIS — I82.403 DEEP VEIN THROMBOSIS (DVT) OF BOTH LOWER EXTREMITIES, UNSPECIFIED CHRONICITY, UNSPECIFIED VEIN (HCC): Primary | ICD-10-CM

## 2019-08-26 LAB — INR PPP: 3.8 (ref 0.9–1.1)

## 2019-08-26 PROCEDURE — 85610 PROTHROMBIN TIME: CPT | Performed by: FAMILY MEDICINE

## 2019-08-27 DIAGNOSIS — D68.318 COAGULATION DISORDER DUE TO CIRCULATING ANTICOAGULANTS (HCC): ICD-10-CM

## 2019-08-27 DIAGNOSIS — I82.403 RECURRENT ACUTE DEEP VEIN THROMBOSIS (DVT) OF BOTH LOWER EXTREMITIES (HCC): ICD-10-CM

## 2019-08-27 RX ORDER — WARFARIN SODIUM 4 MG/1
TABLET ORAL
Qty: 16 TABLET | Refills: 3 | Status: SHIPPED | OUTPATIENT
Start: 2019-08-27

## 2019-08-27 RX ORDER — WARFARIN SODIUM 3 MG/1
TABLET ORAL
Qty: 270 TABLET | Refills: 0
Start: 2019-08-27 | End: 2021-03-11

## 2019-09-19 ENCOUNTER — HOSPITAL ENCOUNTER (OUTPATIENT)
Dept: HOSPITAL 22 - ACC | Age: 64
Discharge: HOME | End: 2019-09-19
Payer: MEDICARE

## 2019-09-19 DIAGNOSIS — Z79.01: ICD-10-CM

## 2019-09-19 DIAGNOSIS — Z79.84: ICD-10-CM

## 2019-09-19 DIAGNOSIS — Z51.81: Primary | ICD-10-CM

## 2019-09-19 LAB — PHA INR FINGERSTICK: 3.3 (ref 0.9–1.1)

## 2019-09-19 PROCEDURE — 85610 PROTHROMBIN TIME: CPT

## 2019-09-19 PROCEDURE — G0463 HOSPITAL OUTPT CLINIC VISIT: HCPCS

## 2019-09-19 PROCEDURE — 99211 OFF/OP EST MAY X REQ PHY/QHP: CPT

## 2019-10-08 ENCOUNTER — HOSPITAL ENCOUNTER (OUTPATIENT)
Dept: HOSPITAL 22 - LAB | Age: 64
Discharge: HOME | End: 2019-10-08
Payer: MEDICARE

## 2019-10-08 DIAGNOSIS — Z79.4: ICD-10-CM

## 2019-10-08 DIAGNOSIS — E11.22: Primary | ICD-10-CM

## 2019-10-08 DIAGNOSIS — Z79.01: ICD-10-CM

## 2019-10-08 DIAGNOSIS — E53.8: ICD-10-CM

## 2019-10-08 DIAGNOSIS — E55.9: ICD-10-CM

## 2019-10-08 DIAGNOSIS — I82.403: ICD-10-CM

## 2019-10-08 DIAGNOSIS — I10: ICD-10-CM

## 2019-10-08 DIAGNOSIS — E78.5: ICD-10-CM

## 2019-10-08 DIAGNOSIS — Z51.81: ICD-10-CM

## 2019-10-08 LAB
ALBUMIN LEVEL: 3.5 GM/DL (ref 3.4–5)
ALBUMIN/GLOB SERPL: 1 {RATIO} (ref 1.1–1.8)
ALP ISO SERPL-ACNC: 62 U/L (ref 46–116)
ALT SERPLBLD-CCNC: 70 U/L (ref 12–78)
ANION GAP SERPL CALC-SCNC: 13.3 MEQ/L (ref 5–15)
AST SERPL QL: 62 U/L (ref 15–37)
BILIRUBIN,TOTAL: 0.3 MG/DL (ref 0.2–1)
BUN SERPL-MCNC: 10 MG/DL (ref 7–18)
CALCIUM SPEC-MCNC: 8.8 MG/DL (ref 8.5–10.1)
CHLORIDE SPEC-SCNC: 107 MMOL/L (ref 98–107)
CHOLEST SPEC-SCNC: 226 MG/DL (ref 140–200)
CO2 SERPL-SCNC: 28 MMOL/L (ref 21–32)
CREAT BLD-SCNC: 0.95 MG/DL (ref 0.55–1.02)
ESTIMATED GLOMERULAR FILT RATE: 59 ML/MIN (ref 60–?)
GFR (AFRICAN AMERICAN): 72 ML/MIN (ref 60–?)
GLOBULIN SER CALC-MCNC: 3.5 GM/DL (ref 1.3–3.2)
GLUCOSE: 139 MG/DL (ref 74–106)
HBA1C MFR BLD: 7.9 % (ref 0–7)
HCT VFR BLD CALC: 38.6 % (ref 37–47)
HDLC SERPL-MCNC: 42 MG/DL (ref 29–89)
HGB BLD-MCNC: 11.4 G/DL (ref 12.2–16.2)
INR PPP: 3.02 (ref 0.9–1.1)
MCHC RBC-ENTMCNC: 29.5 G/DL (ref 31.8–35.4)
MCV RBC: 85.6 FL (ref 81–99)
MEAN CORPUSCULAR HEMOGLOBIN: 25.2 PG (ref 27–31.2)
PHA INR FINGERSTICK: 3 (ref 0.9–1.1)
PLATELET # BLD: 263 K/MM3 (ref 142–424)
POTASSIUM: 5.3 MMOL/L (ref 3.5–5.1)
PROT SERPL-MCNC: 7 GM/DL (ref 6.4–8.2)
PT BLD: 29.8 SECONDS (ref 9.4–11.8)
RBC # BLD AUTO: 4.51 M/MM3 (ref 4.2–5.4)
SODIUM SPEC-SCNC: 143 MMOL/L (ref 136–145)
TRIGLYCERIDES: 151 MG/DL (ref 30–200)
WBC # BLD AUTO: 4.5 K/MM3 (ref 4.8–10.8)

## 2019-10-08 PROCEDURE — 80061 LIPID PANEL: CPT

## 2019-10-08 PROCEDURE — 85025 COMPLETE CBC W/AUTO DIFF WBC: CPT

## 2019-10-08 PROCEDURE — G0463 HOSPITAL OUTPT CLINIC VISIT: HCPCS

## 2019-10-08 PROCEDURE — 82043 UR ALBUMIN QUANTITATIVE: CPT

## 2019-10-08 PROCEDURE — 36415 COLL VENOUS BLD VENIPUNCTURE: CPT

## 2019-10-08 PROCEDURE — 99211 OFF/OP EST MAY X REQ PHY/QHP: CPT

## 2019-10-08 PROCEDURE — 80053 COMPREHEN METABOLIC PANEL: CPT

## 2019-10-08 PROCEDURE — 82570 ASSAY OF URINE CREATININE: CPT

## 2019-10-08 PROCEDURE — 82652 VIT D 1 25-DIHYDROXY: CPT

## 2019-10-08 PROCEDURE — 85610 PROTHROMBIN TIME: CPT

## 2019-10-08 PROCEDURE — 83036 HEMOGLOBIN GLYCOSYLATED A1C: CPT

## 2019-10-08 PROCEDURE — 82607 VITAMIN B-12: CPT

## 2019-10-09 LAB — VITAMIN B12: 1293 PG/ML (ref 232–1245)

## 2019-10-11 ENCOUNTER — ANTICOAGULATION VISIT (OUTPATIENT)
Dept: FAMILY MEDICINE CLINIC | Facility: CLINIC | Age: 64
End: 2019-10-11

## 2019-10-25 ENCOUNTER — HOSPITAL ENCOUNTER (OUTPATIENT)
Dept: HOSPITAL 22 - ACC | Age: 64
Discharge: HOME | End: 2019-10-25
Payer: MEDICARE

## 2019-10-25 DIAGNOSIS — Z51.81: Primary | ICD-10-CM

## 2019-10-25 DIAGNOSIS — Z79.01: ICD-10-CM

## 2019-10-25 LAB — PHA INR FINGERSTICK: 2.5 (ref 0.9–1.1)

## 2019-10-25 PROCEDURE — G0463 HOSPITAL OUTPT CLINIC VISIT: HCPCS

## 2019-10-25 PROCEDURE — 85610 PROTHROMBIN TIME: CPT

## 2019-10-25 PROCEDURE — 99211 OFF/OP EST MAY X REQ PHY/QHP: CPT

## 2019-11-26 ENCOUNTER — HOSPITAL ENCOUNTER (OUTPATIENT)
Age: 64
End: 2019-11-26
Payer: MEDICARE

## 2019-11-26 DIAGNOSIS — Z79.01: ICD-10-CM

## 2019-11-26 DIAGNOSIS — Z51.81: Primary | ICD-10-CM

## 2019-11-26 LAB
INR PPP: 1.95 (ref 0.9–1.1)
PT BLD: 19.6 SECONDS (ref 9.4–11.8)

## 2019-11-26 PROCEDURE — 36415 COLL VENOUS BLD VENIPUNCTURE: CPT

## 2019-11-26 PROCEDURE — 85610 PROTHROMBIN TIME: CPT

## 2020-01-10 ENCOUNTER — HOSPITAL ENCOUNTER (OUTPATIENT)
Dept: HOSPITAL 22 - ACC | Age: 65
Discharge: HOME | End: 2020-01-10
Payer: MEDICARE

## 2020-01-10 DIAGNOSIS — Z79.01: Primary | ICD-10-CM

## 2020-01-10 LAB — PHA INR FINGERSTICK: 2.4 (ref 0.9–1.1)

## 2020-01-10 PROCEDURE — 99211 OFF/OP EST MAY X REQ PHY/QHP: CPT

## 2020-01-10 PROCEDURE — 85610 PROTHROMBIN TIME: CPT

## 2020-01-10 PROCEDURE — G0463 HOSPITAL OUTPT CLINIC VISIT: HCPCS

## 2020-01-28 ENCOUNTER — HOSPITAL ENCOUNTER (OUTPATIENT)
Age: 65
End: 2020-01-28
Payer: MEDICARE

## 2020-01-28 DIAGNOSIS — G89.11: Primary | ICD-10-CM

## 2020-01-28 DIAGNOSIS — M25.531: ICD-10-CM

## 2020-01-28 PROCEDURE — 73110 X-RAY EXAM OF WRIST: CPT

## 2020-02-13 ENCOUNTER — HOSPITAL ENCOUNTER (OUTPATIENT)
Dept: HOSPITAL 22 - ACC | Age: 65
Discharge: HOME | End: 2020-02-13
Payer: MEDICARE

## 2020-02-13 DIAGNOSIS — Z79.01: ICD-10-CM

## 2020-02-13 DIAGNOSIS — Z51.81: Primary | ICD-10-CM

## 2020-02-13 LAB — PHA INR FINGERSTICK: 1.7 (ref 0.9–1.1)

## 2020-02-13 PROCEDURE — 99211 OFF/OP EST MAY X REQ PHY/QHP: CPT

## 2020-02-13 PROCEDURE — G0463 HOSPITAL OUTPT CLINIC VISIT: HCPCS

## 2020-02-13 PROCEDURE — 85610 PROTHROMBIN TIME: CPT

## 2020-02-26 ENCOUNTER — HOSPITAL ENCOUNTER (OUTPATIENT)
Age: 65
End: 2020-02-26
Payer: MEDICARE

## 2020-02-26 DIAGNOSIS — Z12.31: Primary | ICD-10-CM

## 2020-02-26 PROCEDURE — 77063 BREAST TOMOSYNTHESIS BI: CPT

## 2020-02-26 PROCEDURE — 77067 SCR MAMMO BI INCL CAD: CPT

## 2020-03-12 ENCOUNTER — HOSPITAL ENCOUNTER (OUTPATIENT)
Dept: HOSPITAL 22 - ACC | Age: 65
Discharge: HOME | End: 2020-03-12
Payer: MEDICARE

## 2020-03-12 DIAGNOSIS — Z51.81: Primary | ICD-10-CM

## 2020-03-12 DIAGNOSIS — Z79.01: ICD-10-CM

## 2020-03-12 LAB — PHA INR FINGERSTICK: 2.1 (ref 0.9–1.1)

## 2020-03-12 PROCEDURE — G0463 HOSPITAL OUTPT CLINIC VISIT: HCPCS

## 2020-03-12 PROCEDURE — 99211 OFF/OP EST MAY X REQ PHY/QHP: CPT

## 2020-03-12 PROCEDURE — 85610 PROTHROMBIN TIME: CPT

## 2020-04-09 ENCOUNTER — HOSPITAL ENCOUNTER (OUTPATIENT)
Age: 65
Discharge: HOME | End: 2020-04-09
Payer: MEDICARE

## 2020-04-09 DIAGNOSIS — E11.22: ICD-10-CM

## 2020-04-09 DIAGNOSIS — Z51.81: ICD-10-CM

## 2020-04-09 DIAGNOSIS — Z79.4: ICD-10-CM

## 2020-04-09 DIAGNOSIS — R10.32: Primary | ICD-10-CM

## 2020-04-09 DIAGNOSIS — E55.9: ICD-10-CM

## 2020-04-09 DIAGNOSIS — E53.8: ICD-10-CM

## 2020-04-09 DIAGNOSIS — Z79.01: ICD-10-CM

## 2020-04-09 DIAGNOSIS — Z79.84: ICD-10-CM

## 2020-04-09 LAB
ALBUMIN LEVEL: 4.3 G/DL (ref 3.5–5)
ALBUMIN/GLOB SERPL: 1.4 {RATIO} (ref 1.1–1.8)
ALP ISO SERPL-ACNC: 65 U/L (ref 38–126)
ALT SERPLBLD-CCNC: 90 U/L (ref 12–78)
ANION GAP SERPL CALC-SCNC: 14.8 MEQ/L (ref 5–15)
AST SERPL QL: 116 U/L (ref 14–36)
BILIRUBIN,TOTAL: 0.3 MG/DL (ref 0.2–1.3)
BUN SERPL-MCNC: 15 MG/DL (ref 7–17)
CALCIUM SPEC-MCNC: 10 MG/DL (ref 8.4–10.2)
CHLORIDE SPEC-SCNC: 104 MMOL/L (ref 98–107)
CHOLEST SPEC-SCNC: 230 MG/DL (ref 140–200)
CO2 SERPL-SCNC: 23 MMOL/L (ref 22–30)
CREAT BLD-SCNC: 0.9 MG/DL (ref 0.52–1.04)
ESTIMATED GLOMERULAR FILT RATE: 63 ML/MIN (ref 60–?)
GFR (AFRICAN AMERICAN): 76 ML/MIN (ref 60–?)
GLOBULIN SER CALC-MCNC: 3.1 G/DL (ref 1.3–3.2)
GLUCOSE: 144 MG/DL (ref 74–100)
HBA1C MFR BLD: 7.1 % (ref 4–6)
HCT VFR BLD CALC: 41.2 % (ref 37–47)
HDLC SERPL-MCNC: 41 MG/DL (ref 40–60)
HGB BLD-MCNC: 12.9 G/DL (ref 12.2–16.2)
MCHC RBC-ENTMCNC: 31.4 G/DL (ref 31.8–35.4)
MCV RBC: 84.9 FL (ref 81–99)
MEAN CORPUSCULAR HEMOGLOBIN: 26.7 PG (ref 27–31.2)
PHA INR FINGERSTICK: 2 (ref 0.9–1.1)
PLATELET # BLD: 227 K/MM3 (ref 142–424)
POTASSIUM: 4.8 MMOL/L (ref 3.5–5.1)
PROT SERPL-MCNC: 7.4 G/DL (ref 6.3–8.2)
RBC # BLD AUTO: 4.85 M/MM3 (ref 4.2–5.4)
SODIUM SPEC-SCNC: 137 MMOL/L (ref 136–145)
TRIGLYCERIDES: 171 MG/DL (ref 30–150)
WBC # BLD AUTO: 5.9 K/MM3 (ref 4.8–10.8)

## 2020-04-09 PROCEDURE — 80061 LIPID PANEL: CPT

## 2020-04-09 PROCEDURE — 99211 OFF/OP EST MAY X REQ PHY/QHP: CPT

## 2020-04-09 PROCEDURE — 82607 VITAMIN B-12: CPT

## 2020-04-09 PROCEDURE — 80053 COMPREHEN METABOLIC PANEL: CPT

## 2020-04-09 PROCEDURE — 85610 PROTHROMBIN TIME: CPT

## 2020-04-09 PROCEDURE — 83036 HEMOGLOBIN GLYCOSYLATED A1C: CPT

## 2020-04-09 PROCEDURE — 36415 COLL VENOUS BLD VENIPUNCTURE: CPT

## 2020-04-09 PROCEDURE — 85025 COMPLETE CBC W/AUTO DIFF WBC: CPT

## 2020-04-09 PROCEDURE — 82652 VIT D 1 25-DIHYDROXY: CPT

## 2020-04-09 PROCEDURE — G0463 HOSPITAL OUTPT CLINIC VISIT: HCPCS

## 2020-04-10 LAB — VITAMIN B12: 720 PG/ML (ref 232–1245)

## 2020-04-14 ENCOUNTER — HOSPITAL ENCOUNTER (OUTPATIENT)
Age: 65
End: 2020-04-14
Payer: MEDICARE

## 2020-04-14 DIAGNOSIS — R10.32: Primary | ICD-10-CM

## 2020-04-14 PROCEDURE — 74178 CT ABD&PLV WO CNTR FLWD CNTR: CPT

## 2020-05-11 ENCOUNTER — PRIOR AUTHORIZATION (OUTPATIENT)
Dept: FAMILY MEDICINE CLINIC | Facility: CLINIC | Age: 65
End: 2020-05-11

## 2020-05-14 ENCOUNTER — HOSPITAL ENCOUNTER (OUTPATIENT)
Dept: HOSPITAL 22 - ACC | Age: 65
Discharge: HOME | End: 2020-05-14
Payer: MEDICARE

## 2020-05-14 ENCOUNTER — HOSPITAL ENCOUNTER (OUTPATIENT)
Age: 65
End: 2020-05-14
Payer: MEDICARE

## 2020-05-14 DIAGNOSIS — R10.9: Primary | ICD-10-CM

## 2020-05-14 DIAGNOSIS — Z79.01: Primary | ICD-10-CM

## 2020-05-14 LAB
ALBUMIN LEVEL: 4 G/DL (ref 3.5–5)
ALBUMIN/GLOB SERPL: 1.3 {RATIO} (ref 1.1–1.8)
ALP ISO SERPL-ACNC: 65 U/L (ref 38–126)
ALT SERPLBLD-CCNC: 55 U/L (ref 12–78)
AMYLASE SERPL-CCNC: 35 U/L (ref 30–110)
ANION GAP SERPL CALC-SCNC: 10.6 MEQ/L (ref 5–15)
AST SERPL QL: 72 U/L (ref 14–36)
BILIRUBIN,TOTAL: 0.2 MG/DL (ref 0.2–1.3)
BUN SERPL-MCNC: 13 MG/DL (ref 7–17)
CALCIUM SPEC-MCNC: 9.4 MG/DL (ref 8.4–10.2)
CHLORIDE SPEC-SCNC: 104 MMOL/L (ref 98–107)
CO2 SERPL-SCNC: 26 MMOL/L (ref 22–30)
CREAT BLD-SCNC: 0.8 MG/DL (ref 0.52–1.04)
ESTIMATED GLOMERULAR FILT RATE: 72 ML/MIN (ref 60–?)
GFR (AFRICAN AMERICAN): 87 ML/MIN (ref 60–?)
GLOBULIN SER CALC-MCNC: 3.1 G/DL (ref 1.3–3.2)
GLUCOSE: 229 MG/DL (ref 74–100)
HCT VFR BLD CALC: 40 % (ref 37–47)
HGB BLD-MCNC: 12.7 G/DL (ref 12.2–16.2)
LIPASE: 137 U/L (ref 23–300)
MCHC RBC-ENTMCNC: 31.7 G/DL (ref 31.8–35.4)
MCV RBC: 85.5 FL (ref 81–99)
MEAN CORPUSCULAR HEMOGLOBIN: 27.1 PG (ref 27–31.2)
PHA INR FINGERSTICK: 2.5 (ref 0.9–1.1)
PLATELET # BLD: 242 K/MM3 (ref 142–424)
POTASSIUM: 4.6 MMOL/L (ref 3.5–5.1)
PROT SERPL-MCNC: 7.1 G/DL (ref 6.3–8.2)
RBC # BLD AUTO: 4.68 M/MM3 (ref 4.2–5.4)
SODIUM SPEC-SCNC: 136 MMOL/L (ref 136–145)
WBC # BLD AUTO: 5.2 K/MM3 (ref 4.8–10.8)

## 2020-05-14 PROCEDURE — 83690 ASSAY OF LIPASE: CPT

## 2020-05-14 PROCEDURE — 82150 ASSAY OF AMYLASE: CPT

## 2020-05-14 PROCEDURE — 80053 COMPREHEN METABOLIC PANEL: CPT

## 2020-05-14 PROCEDURE — 36415 COLL VENOUS BLD VENIPUNCTURE: CPT

## 2020-05-14 PROCEDURE — G0463 HOSPITAL OUTPT CLINIC VISIT: HCPCS

## 2020-05-14 PROCEDURE — 85610 PROTHROMBIN TIME: CPT

## 2020-05-14 PROCEDURE — 99211 OFF/OP EST MAY X REQ PHY/QHP: CPT

## 2020-05-14 PROCEDURE — 85025 COMPLETE CBC W/AUTO DIFF WBC: CPT

## 2020-06-18 DIAGNOSIS — E11.9 TYPE 2 DIABETES MELLITUS TREATED WITH INSULIN (HCC): ICD-10-CM

## 2020-06-18 DIAGNOSIS — Z79.4 TYPE 2 DIABETES MELLITUS TREATED WITH INSULIN (HCC): ICD-10-CM

## 2020-06-18 RX ORDER — INSULIN GLARGINE 100 [IU]/ML
INJECTION, SOLUTION SUBCUTANEOUS
Qty: 45 ML | Refills: 3 | Status: SHIPPED | OUTPATIENT
Start: 2020-06-18 | End: 2021-07-15

## 2020-06-25 ENCOUNTER — HOSPITAL ENCOUNTER (OUTPATIENT)
Dept: HOSPITAL 22 - ACC | Age: 65
Discharge: HOME | End: 2020-06-25
Payer: MEDICARE

## 2020-06-25 DIAGNOSIS — Z51.81: Primary | ICD-10-CM

## 2020-06-25 DIAGNOSIS — Z79.01: ICD-10-CM

## 2020-06-25 LAB — PHA INR FINGERSTICK: 2.3 (ref 0.9–1.1)

## 2020-06-25 PROCEDURE — 85610 PROTHROMBIN TIME: CPT

## 2020-06-25 PROCEDURE — G0463 HOSPITAL OUTPT CLINIC VISIT: HCPCS

## 2020-06-25 PROCEDURE — 99211 OFF/OP EST MAY X REQ PHY/QHP: CPT

## 2020-08-06 ENCOUNTER — HOSPITAL ENCOUNTER (OUTPATIENT)
Dept: HOSPITAL 22 - ACC | Age: 65
Discharge: HOME | End: 2020-08-06
Payer: MEDICARE

## 2020-08-06 DIAGNOSIS — Z79.01: ICD-10-CM

## 2020-08-06 DIAGNOSIS — Z51.81: Primary | ICD-10-CM

## 2020-08-06 LAB — PHA INR FINGERSTICK: 2.1 (ref 0.9–1.1)

## 2020-08-06 PROCEDURE — G0463 HOSPITAL OUTPT CLINIC VISIT: HCPCS

## 2020-08-06 PROCEDURE — 85610 PROTHROMBIN TIME: CPT

## 2020-08-06 PROCEDURE — 99211 OFF/OP EST MAY X REQ PHY/QHP: CPT

## 2020-08-19 ENCOUNTER — TELEPHONE (OUTPATIENT)
Dept: FAMILY MEDICINE CLINIC | Facility: CLINIC | Age: 65
End: 2020-08-19

## 2020-08-19 DIAGNOSIS — Z12.31 BREAST CANCER SCREENING BY MAMMOGRAM: Primary | ICD-10-CM

## 2020-08-19 NOTE — TELEPHONE ENCOUNTER
Attempted to contact patient to have her come into the office for an INR. No answer; no voicemail.    Patient also needs to schedule a medicare wellness and mammogram

## 2020-08-24 NOTE — TELEPHONE ENCOUNTER
chavez Shrestha had sent a mammogram order to [facility] for scheduling for this patient.  They returned it to me stating that the patient had refused because she has changed PCP's.  I did send a message to Dr Alcala on the refusal, but I thought I would tell you as well since you have these other pending items for this patient.

## 2020-09-17 ENCOUNTER — HOSPITAL ENCOUNTER (OUTPATIENT)
Dept: HOSPITAL 22 - ACC | Age: 65
Discharge: HOME | End: 2020-09-17
Payer: MEDICARE

## 2020-09-17 DIAGNOSIS — Z51.81: Primary | ICD-10-CM

## 2020-09-17 DIAGNOSIS — Z79.01: ICD-10-CM

## 2020-09-17 LAB — PHA INR FINGERSTICK: 1.9 (ref 0.9–1.1)

## 2020-09-17 PROCEDURE — G0463 HOSPITAL OUTPT CLINIC VISIT: HCPCS

## 2020-09-17 PROCEDURE — 85610 PROTHROMBIN TIME: CPT

## 2020-09-17 PROCEDURE — 99211 OFF/OP EST MAY X REQ PHY/QHP: CPT

## 2020-09-21 ENCOUNTER — HOSPITAL ENCOUNTER (OUTPATIENT)
Age: 65
End: 2020-09-21
Payer: MEDICARE

## 2020-09-21 DIAGNOSIS — Z13.820: Primary | ICD-10-CM

## 2020-09-21 DIAGNOSIS — Z78.0: ICD-10-CM

## 2020-09-21 PROCEDURE — 77080 DXA BONE DENSITY AXIAL: CPT

## 2020-10-27 ENCOUNTER — HOSPITAL ENCOUNTER (OUTPATIENT)
Dept: HOSPITAL 22 - INF | Age: 65
Discharge: HOME | End: 2020-10-27
Payer: MEDICARE

## 2020-10-27 VITALS
RESPIRATION RATE: 18 BRPM | DIASTOLIC BLOOD PRESSURE: 80 MMHG | SYSTOLIC BLOOD PRESSURE: 153 MMHG | OXYGEN SATURATION: 97 % | HEART RATE: 74 BPM | TEMPERATURE: 97.3 F

## 2020-10-27 DIAGNOSIS — E78.5: ICD-10-CM

## 2020-10-27 DIAGNOSIS — N18.2: ICD-10-CM

## 2020-10-27 DIAGNOSIS — M81.0: ICD-10-CM

## 2020-10-27 DIAGNOSIS — E11.22: Primary | ICD-10-CM

## 2020-10-27 DIAGNOSIS — I82.403: ICD-10-CM

## 2020-10-27 DIAGNOSIS — Z79.01: ICD-10-CM

## 2020-10-27 DIAGNOSIS — Z51.81: ICD-10-CM

## 2020-10-27 LAB
ALBUMIN LEVEL: 4.5 G/DL (ref 3.5–5)
ALBUMIN/GLOB SERPL: 1.5 {RATIO} (ref 1.1–1.8)
ALP ISO SERPL-ACNC: 63 U/L (ref 38–126)
ALT SERPLBLD-CCNC: 80 U/L (ref 12–78)
ANION GAP SERPL CALC-SCNC: 14.1 MEQ/L (ref 5–15)
AST SERPL QL: 76 U/L (ref 14–36)
BILIRUBIN,TOTAL: 0.5 MG/DL (ref 0.2–1.3)
BUN SERPL-MCNC: 20 MG/DL (ref 7–17)
CALCIUM SPEC-MCNC: 9.8 MG/DL (ref 8.4–10.2)
CHLORIDE SPEC-SCNC: 102 MMOL/L (ref 98–107)
CHOLEST SPEC-SCNC: 246 MG/DL (ref 140–200)
CO2 SERPL-SCNC: 28 MMOL/L (ref 22–30)
CREAT BLD-SCNC: 1 MG/DL (ref 0.52–1.04)
ESTIMATED GLOMERULAR FILT RATE: 56 ML/MIN (ref 60–?)
GFR (AFRICAN AMERICAN): 67 ML/MIN (ref 60–?)
GLOBULIN SER CALC-MCNC: 3 G/DL (ref 1.3–3.2)
GLUCOSE: 140 MG/DL (ref 74–100)
HBA1C MFR BLD: 7.2 % (ref 4–6)
HCT VFR BLD CALC: 44.2 % (ref 37–47)
HDLC SERPL-MCNC: 46 MG/DL (ref 40–60)
HGB BLD-MCNC: 14.1 G/DL (ref 12.2–16.2)
INR PPP: 1.59 (ref 0.9–1.1)
MCHC RBC-ENTMCNC: 32 G/DL (ref 31.8–35.4)
MCV RBC: 89.6 FL (ref 81–99)
MEAN CORPUSCULAR HEMOGLOBIN: 28.7 PG (ref 27–31.2)
PLATELET # BLD: 254 K/MM3 (ref 142–424)
POTASSIUM: 5.1 MMOL/L (ref 3.5–5.1)
PROT SERPL-MCNC: 7.5 G/DL (ref 6.3–8.2)
PT BLD: 16.9 SECONDS (ref 9.4–11.8)
RBC # BLD AUTO: 4.93 M/MM3 (ref 4.2–5.4)
SODIUM SPEC-SCNC: 139 MMOL/L (ref 136–145)
TRIGLYCERIDES: 188 MG/DL (ref 30–150)
WBC # BLD AUTO: 6.2 K/MM3 (ref 4.8–10.8)

## 2020-10-27 PROCEDURE — 82043 UR ALBUMIN QUANTITATIVE: CPT

## 2020-10-27 PROCEDURE — 85610 PROTHROMBIN TIME: CPT

## 2020-10-27 PROCEDURE — 36415 COLL VENOUS BLD VENIPUNCTURE: CPT

## 2020-10-27 PROCEDURE — 83036 HEMOGLOBIN GLYCOSYLATED A1C: CPT

## 2020-10-27 PROCEDURE — 82570 ASSAY OF URINE CREATININE: CPT

## 2020-10-27 PROCEDURE — 96372 THER/PROPH/DIAG INJ SC/IM: CPT

## 2020-10-27 PROCEDURE — 85025 COMPLETE CBC W/AUTO DIFF WBC: CPT

## 2020-10-27 PROCEDURE — 80053 COMPREHEN METABOLIC PANEL: CPT

## 2020-10-27 PROCEDURE — 80061 LIPID PANEL: CPT

## 2020-11-30 ENCOUNTER — HOSPITAL ENCOUNTER (OUTPATIENT)
Dept: HOSPITAL 22 - ACC | Age: 65
Discharge: HOME | End: 2020-11-30
Payer: MEDICARE

## 2020-11-30 DIAGNOSIS — Z51.81: Primary | ICD-10-CM

## 2020-11-30 DIAGNOSIS — Z79.01: ICD-10-CM

## 2020-11-30 LAB — PHA INR FINGERSTICK: 1.8 (ref 0.9–1.1)

## 2020-11-30 PROCEDURE — G0463 HOSPITAL OUTPT CLINIC VISIT: HCPCS

## 2020-11-30 PROCEDURE — 85610 PROTHROMBIN TIME: CPT

## 2020-11-30 PROCEDURE — 99211 OFF/OP EST MAY X REQ PHY/QHP: CPT

## 2020-12-10 RX ORDER — BLOOD SUGAR DIAGNOSTIC
STRIP MISCELLANEOUS
OUTPATIENT
Start: 2020-12-10

## 2021-01-11 ENCOUNTER — HOSPITAL ENCOUNTER (OUTPATIENT)
Dept: HOSPITAL 22 - ACC | Age: 66
Discharge: HOME | End: 2021-01-11
Payer: MEDICARE

## 2021-01-11 DIAGNOSIS — Z51.81: Primary | ICD-10-CM

## 2021-01-11 DIAGNOSIS — Z79.01: ICD-10-CM

## 2021-01-11 LAB — PHA INR FINGERSTICK: 2.4 (ref 0.9–1.1)

## 2021-01-11 PROCEDURE — 85610 PROTHROMBIN TIME: CPT

## 2021-01-11 PROCEDURE — 99211 OFF/OP EST MAY X REQ PHY/QHP: CPT

## 2021-01-11 PROCEDURE — G0463 HOSPITAL OUTPT CLINIC VISIT: HCPCS

## 2021-02-23 ENCOUNTER — HOSPITAL ENCOUNTER (OUTPATIENT)
Dept: HOSPITAL 22 - ACC | Age: 66
Discharge: HOME | End: 2021-02-23
Payer: MEDICARE

## 2021-02-23 DIAGNOSIS — Z79.01: ICD-10-CM

## 2021-02-23 DIAGNOSIS — Z51.81: Primary | ICD-10-CM

## 2021-02-23 LAB — PHA INR FINGERSTICK: 2.3 (ref 0.9–1.1)

## 2021-02-23 PROCEDURE — 99211 OFF/OP EST MAY X REQ PHY/QHP: CPT

## 2021-02-23 PROCEDURE — 85610 PROTHROMBIN TIME: CPT

## 2021-02-23 PROCEDURE — G0463 HOSPITAL OUTPT CLINIC VISIT: HCPCS

## 2021-03-04 ENCOUNTER — HOSPITAL ENCOUNTER (OUTPATIENT)
Age: 66
End: 2021-03-04
Payer: MEDICARE

## 2021-03-04 DIAGNOSIS — M47.818: Primary | ICD-10-CM

## 2021-03-04 PROCEDURE — 72202 X-RAY EXAM SI JOINTS 3/> VWS: CPT

## 2021-03-11 ENCOUNTER — OFFICE VISIT (OUTPATIENT)
Dept: FAMILY MEDICINE CLINIC | Facility: CLINIC | Age: 66
End: 2021-03-11

## 2021-03-11 VITALS
HEIGHT: 66 IN | TEMPERATURE: 97.4 F | SYSTOLIC BLOOD PRESSURE: 130 MMHG | HEART RATE: 85 BPM | BODY MASS INDEX: 36.16 KG/M2 | WEIGHT: 225 LBS | DIASTOLIC BLOOD PRESSURE: 70 MMHG | OXYGEN SATURATION: 96 %

## 2021-03-11 DIAGNOSIS — I10 ESSENTIAL HYPERTENSION: ICD-10-CM

## 2021-03-11 DIAGNOSIS — M54.2 CERVICAL SPINE PAIN: ICD-10-CM

## 2021-03-11 DIAGNOSIS — I82.403 RECURRENT ACUTE DEEP VEIN THROMBOSIS (DVT) OF BOTH LOWER EXTREMITIES (HCC): ICD-10-CM

## 2021-03-11 DIAGNOSIS — G89.29 CHRONIC MIDLINE LOW BACK PAIN WITHOUT SCIATICA: ICD-10-CM

## 2021-03-11 DIAGNOSIS — E53.8 VITAMIN B12 DEFICIENCY: ICD-10-CM

## 2021-03-11 DIAGNOSIS — I47.9 PAROXYSMAL TACHYCARDIA (HCC): ICD-10-CM

## 2021-03-11 DIAGNOSIS — K21.00 GASTROESOPHAGEAL REFLUX DISEASE WITH ESOPHAGITIS WITHOUT HEMORRHAGE: ICD-10-CM

## 2021-03-11 DIAGNOSIS — Z87.81 HISTORY OF CERVICAL FRACTURE: ICD-10-CM

## 2021-03-11 DIAGNOSIS — Z79.4 TYPE 2 DIABETES MELLITUS TREATED WITH INSULIN (HCC): ICD-10-CM

## 2021-03-11 DIAGNOSIS — E11.9 TYPE 2 DIABETES MELLITUS TREATED WITH INSULIN (HCC): ICD-10-CM

## 2021-03-11 DIAGNOSIS — M54.50 CHRONIC MIDLINE LOW BACK PAIN WITHOUT SCIATICA: ICD-10-CM

## 2021-03-11 DIAGNOSIS — F51.01 PRIMARY INSOMNIA: ICD-10-CM

## 2021-03-11 DIAGNOSIS — D68.318 COAGULATION DISORDER DUE TO CIRCULATING ANTICOAGULANTS (HCC): ICD-10-CM

## 2021-03-11 DIAGNOSIS — E55.9 VITAMIN D DEFICIENCY: ICD-10-CM

## 2021-03-11 PROCEDURE — 99214 OFFICE O/P EST MOD 30 MIN: CPT | Performed by: NURSE PRACTITIONER

## 2021-03-11 NOTE — PROGRESS NOTES
Subjective   Kassandra Merritt is a 65 y.o. female.       64yo female, here for routine preventative medical exam. She established care with Dr Alcala in 4/2019, when moving to [place],because her previous PCP was in [place]. She has been going to her previous PCP since, though, because she felt more comfortable with a woman. She does not want to continue driving to [place] though, so returned to office to re-establish care with a female. Patient has a known history of type 2 diabetes mellitus, currently treated with insulin.  She is also treated with both long-acting insulin and a combination of long-acting and short-acting.  In addition to this she is also utilizing oral hypoglycemic medication metformin.  She denies any cyclical/persistent hypoglycemic episodes, but has noticed fluctuations to her blood glucose control since being home more related to COVID and gaining weight. Her A1c was 7.2, 5 months ago per patient report.  She denies any chest pain or any shortness of breath.  Patient does have a known history of essential hypertension as well as GERD with esophagitis, no reports of aspiration or dysphagia. GERD controlled. She has a history of paroxysmal tachycardia, with recurrent DVTs in the past.  I do suspect she is dealt with paroxysmal atrial fibrillation presumably.  She is currently on anticoagulation. INR 2 weeks ago was 2.3, per patient report.     She has complaints today for increased back and neck pain. She has history of cervical fracture many years ago, and has increased pain recently, related to arthritis. She has been having swelling and inflammation to the left side of her neck for the past couple weeks, but can not take any anti-inflammatories due to her anticoagulant. She has had LBP for several years, but worse the past couple months, but feels it because she has been more sedentary and gained weight. Her previous PCP told her to increase walking and exercise to help with her pain. She  "has been doing this as much as weather will allow but can not walk very much without her back hurting worse. She takes Tylenol and it helps but not much, but she does not want to take any pain medication.     Patient reports that she just had a DEXA scan in September 2020 and was told she has severe Osteoporosis. She is taking Calcium and Vitamin D bid, and has been advised to take Fosamax or an injectable medication for her Osteoporosis, but she is afraid of SE and has not decided if she is going to take any of these medications.       The following portions of the patient's history were reviewed and updated as appropriate: allergies, current medications, past family history, past medical history, past social history, past surgical history and problem list.    Review of Systems   Constitutional: Negative.    HENT: Negative.    Eyes: Negative.    Respiratory: Negative.    Cardiovascular: Negative.    Gastrointestinal: Negative.    Genitourinary: Negative.    Musculoskeletal: Positive for arthralgias, back pain and neck pain.   Skin: Negative.    Allergic/Immunologic: Negative.    Neurological: Negative for dizziness, syncope, weakness and numbness.   Hematological: Negative for adenopathy.   Psychiatric/Behavioral: Negative for confusion and suicidal ideas. The patient is not nervous/anxious.      Vitals:    03/11/21 1641   BP: 130/70   Pulse: 85   Temp: 97.4 °F (36.3 °C)   SpO2: 96%   Weight: 102 kg (225 lb)   Height: 166.4 cm (65.5\")     Objective   Physical Exam  Vitals and nursing note reviewed.   Constitutional:       General: She is not in acute distress.     Appearance: She is well-developed.   HENT:      Head: Normocephalic.      Right Ear: External ear normal.      Left Ear: External ear normal.      Nose: Nose normal.      Mouth/Throat:      Pharynx: No oropharyngeal exudate.   Eyes:      Conjunctiva/sclera: Conjunctivae normal.   Neck:      Thyroid: No thyromegaly.      Trachea: No tracheal deviation. "   Cardiovascular:      Rate and Rhythm: Normal rate and regular rhythm.      Heart sounds: Normal heart sounds. No murmur.   Pulmonary:      Effort: Pulmonary effort is normal. No respiratory distress.      Breath sounds: Normal breath sounds. No wheezing or rales.   Chest:      Chest wall: No tenderness.   Abdominal:      General: Bowel sounds are normal. There is no distension.      Palpations: Abdomen is soft. There is no splenomegaly or mass.      Tenderness: There is no abdominal tenderness. There is no guarding or rebound.      Hernia: No hernia is present.   Musculoskeletal:      Cervical back: Neck supple. Spasms (left lateral) present. Decreased range of motion.      Lumbar back: Tenderness present. Decreased range of motion.        Back:    Lymphadenopathy:      Cervical: No cervical adenopathy.      Right cervical: No superficial, deep or posterior cervical adenopathy.     Left cervical: No superficial, deep or posterior cervical adenopathy.   Skin:     General: Skin is warm and dry.      Findings: No rash.   Neurological:      Mental Status: She is alert and oriented to person, place, and time.      Coordination: Coordination normal.      Gait: Gait normal.   Psychiatric:         Behavior: Behavior normal.         Thought Content: Thought content normal.         Judgment: Judgment normal.         Assessment/Plan   Diagnoses and all orders for this visit:    1. Paroxysmal tachycardia (CMS/HCC)    2. 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 (CMS/HCC)  -     Comprehensive Metabolic Panel  -     CBC (No Diff)    3. Essential hypertension  -     Comprehensive Metabolic Panel  -     CBC (No Diff)    4. Recurrent acute deep vein thrombosis (DVT) of both lower extremities (CMS/HCC)  -     Comprehensive Metabolic Panel  -     CBC (No Diff)    5. Type 2 diabetes mellitus treated with insulin (CMS/HCC)  -     Comprehensive Metabolic Panel  -     CBC (No Diff)  -     Hemoglobin A1c    6. Vitamin B12  deficiency  -     Vitamin B12    7. Vitamin D deficiency  -     Vitamin D 25 Hydroxy    8. Primary insomnia  -     Comprehensive Metabolic Panel  -     CBC (No Diff)    9. Gastroesophageal reflux disease with esophagitis without hemorrhage    10. Chronic midline low back pain without sciatica  -     Ambulatory Referral to Physical Therapy Evaluate and treat    11. Cervical spine pain    12. History of cervical fracture      64yo female, here for routine preventative medical exam. She established care with Dr Alcala in 4/2019, when moving to [place],because her previous PCP was in [place]. She has been going to her previous PCP since, though, because she felt more comfortable with a woman. She does not want to continue driving to [place] though, so returned to office to re-establish care with a female. Patient has a known history of type 2 diabetes mellitus, currently treated with insulin.  She is also treated with both long-acting insulin and a combination of long-acting and short-acting.  In addition to this she is also utilizing oral hypoglycemic medication metformin.  She denies any cyclical/persistent hypoglycemic episodes, but has noticed fluctuations to her blood glucose control since being home more related to COVID and gaining weight. Her A1c was 7.2, 5 months ago per patient report.  She denies any chest pain or any shortness of breath.  Patient does have a known history of essential hypertension as well as GERD with esophagitis, no reports of aspiration or dysphagia. GERD controlled. She has a history of paroxysmal tachycardia, with recurrent DVTs in the past.  I do suspect she is dealt with paroxysmal atrial fibrillation presumably.  She is currently on anticoagulation. INR 2 weeks ago was 2.3, per patient report. Labs ordered and she will RTC in 2 weeks fasting for labs. INR will be obtained at this time as well. I will contact patient regarding test results and provide instructions regarding any  necessary changes in plan of care.    She has complaints today for increased back and neck pain. She has history of cervical fracture many years ago, and has increased pain recently, related to arthritis. She has been having swelling and inflammation to the left side of her neck for the past couple weeks, but can not take any anti-inflammatories due to her anticoagulant. She has had LBP for several years, but worse the past couple months, but feels it because she has been more sedentary and gained weight. Her previous PCP told her to increase walking and exercise to help with her pain. She has been doing this as much as weather will allow but can not walk very much without her back hurting worse. She takes Tylenol and it helps but not much, but she does not want to take any pain medication. Referral placed for PT for evaluation and treatment. Will follow up and continue to monitor.     Patient reports that she just had a DEXA scan in September 2020 and was told she has severe Osteoporosis. She is taking Calcium and Vitamin D bid, and has been advised to take Fosamax or an injectable medication for her Osteoporosis, but she is afraid of SE and has not decided if she is going to take any of these medications.  Patient educated on importance of taking Biphosphonate for her Osteoporosis. She reports she will research some about these medications and make a decision.     All Lab and imaging results for the past 6 months, have been requested from [facility]. Will follow up with these.     Patient was encouraged to keep me informed of any acute changes, lack of improvement, or any new concerning symptoms. Patient voiced understanding of all instructions and denied further questions.    Patient to RTC in 6 weeks for follow up.

## 2021-03-13 PROBLEM — M54.2 CERVICAL SPINE PAIN: Status: ACTIVE | Noted: 2021-03-13

## 2021-03-13 PROBLEM — Z87.81 HISTORY OF CERVICAL FRACTURE: Status: ACTIVE | Noted: 2021-03-13

## 2021-03-13 PROBLEM — M54.50 CHRONIC MIDLINE LOW BACK PAIN WITHOUT SCIATICA: Status: ACTIVE | Noted: 2021-03-13

## 2021-03-13 PROBLEM — G89.29 CHRONIC MIDLINE LOW BACK PAIN WITHOUT SCIATICA: Status: ACTIVE | Noted: 2021-03-13

## 2021-04-06 ENCOUNTER — HOSPITAL ENCOUNTER (OUTPATIENT)
Age: 66
End: 2021-04-06
Payer: MEDICARE

## 2021-04-06 VITALS — HEART RATE: 71 BPM

## 2021-04-06 DIAGNOSIS — E55.9: ICD-10-CM

## 2021-04-06 DIAGNOSIS — E78.5: ICD-10-CM

## 2021-04-06 DIAGNOSIS — E11.22: ICD-10-CM

## 2021-04-06 DIAGNOSIS — Z86.718: ICD-10-CM

## 2021-04-06 DIAGNOSIS — E53.8: ICD-10-CM

## 2021-04-06 DIAGNOSIS — Z51.81: ICD-10-CM

## 2021-04-06 DIAGNOSIS — R53.83: ICD-10-CM

## 2021-04-06 DIAGNOSIS — Z79.01: ICD-10-CM

## 2021-04-06 DIAGNOSIS — R06.02: Primary | ICD-10-CM

## 2021-04-06 DIAGNOSIS — D50.9: ICD-10-CM

## 2021-04-06 DIAGNOSIS — Z79.84: ICD-10-CM

## 2021-04-06 LAB
25-OH VITAMIN D, TOTAL: 66.7 NG/ML (ref 30–100)
ALBUMIN LEVEL: 4.7 G/DL (ref 3.5–5)
ALBUMIN/GLOB SERPL: 1.6 {RATIO} (ref 1.1–1.8)
ALP ISO SERPL-ACNC: 61 U/L (ref 38–126)
ALT SERPLBLD-CCNC: 74 U/L (ref 12–78)
ANION GAP SERPL CALC-SCNC: 13.3 MEQ/L (ref 5–15)
AST SERPL QL: 72 U/L (ref 14–36)
BILIRUBIN,TOTAL: 0.5 MG/DL (ref 0.2–1.3)
BUN SERPL-MCNC: 17 MG/DL (ref 7–17)
CALCIUM SPEC-MCNC: 9.9 MG/DL (ref 8.4–10.2)
CHLORIDE SPEC-SCNC: 106 MMOL/L (ref 98–107)
CHOLEST SPEC-SCNC: 249 MG/DL (ref 140–200)
CO2 SERPL-SCNC: 26 MMOL/L (ref 22–30)
CREAT BLD-SCNC: 1 MG/DL (ref 0.52–1.04)
ESTIMATED GLOMERULAR FILT RATE: 56 ML/MIN (ref 60–?)
GFR (AFRICAN AMERICAN): 67 ML/MIN (ref 60–?)
GLOBULIN SER CALC-MCNC: 2.9 G/DL (ref 1.3–3.2)
GLUCOSE: 161 MG/DL (ref 74–100)
HBA1C MFR BLD: 7.7 % (ref 4–6)
HCT VFR BLD CALC: 44.2 % (ref 37–47)
HDLC SERPL-MCNC: 50 MG/DL (ref 40–60)
HGB BLD-MCNC: 13.9 G/DL (ref 12.2–16.2)
INR PPP: 1.65 (ref 0.9–1.1)
MCHC RBC-ENTMCNC: 31.6 G/DL (ref 31.8–35.4)
MCV RBC: 88.7 FL (ref 81–99)
MEAN CORPUSCULAR HEMOGLOBIN: 28 PG (ref 27–31.2)
PLATELET # BLD: 244 K/MM3 (ref 142–424)
POTASSIUM: 5.3 MMOL/L (ref 3.5–5.1)
PROT SERPL-MCNC: 7.6 G/DL (ref 6.3–8.2)
PT BLD: 18.8 SECONDS (ref 10.1–12.5)
RBC # BLD AUTO: 4.98 M/MM3 (ref 4.2–5.4)
SODIUM SPEC-SCNC: 140 MMOL/L (ref 136–145)
TRIGLYCERIDES: 200 MG/DL (ref 30–150)
TSH SERPL-ACNC: 3.12 UIU/ML (ref 0.47–4.68)
VITAMIN B12: 428 PG/ML (ref 239–931)
WBC # BLD AUTO: 5.5 K/MM3 (ref 4.8–10.8)

## 2021-04-06 PROCEDURE — 94729 DIFFUSING CAPACITY: CPT

## 2021-04-06 PROCEDURE — 80061 LIPID PANEL: CPT

## 2021-04-06 PROCEDURE — 82306 VITAMIN D 25 HYDROXY: CPT

## 2021-04-06 PROCEDURE — 94640 AIRWAY INHALATION TREATMENT: CPT

## 2021-04-06 PROCEDURE — 85025 COMPLETE CBC W/AUTO DIFF WBC: CPT

## 2021-04-06 PROCEDURE — 36415 COLL VENOUS BLD VENIPUNCTURE: CPT

## 2021-04-06 PROCEDURE — 80053 COMPREHEN METABOLIC PANEL: CPT

## 2021-04-06 PROCEDURE — 84443 ASSAY THYROID STIM HORMONE: CPT

## 2021-04-06 PROCEDURE — 83036 HEMOGLOBIN GLYCOSYLATED A1C: CPT

## 2021-04-06 PROCEDURE — 94060 EVALUATION OF WHEEZING: CPT

## 2021-04-06 PROCEDURE — 82607 VITAMIN B-12: CPT

## 2021-04-06 PROCEDURE — 94726 PLETHYSMOGRAPHY LUNG VOLUMES: CPT

## 2021-04-06 PROCEDURE — 85610 PROTHROMBIN TIME: CPT

## 2021-04-06 PROCEDURE — 94618 PULMONARY STRESS TESTING: CPT

## 2021-04-13 ENCOUNTER — HOSPITAL ENCOUNTER (OUTPATIENT)
Age: 66
End: 2021-04-13
Payer: MEDICARE

## 2021-04-13 DIAGNOSIS — R06.02: Primary | ICD-10-CM

## 2021-04-13 PROCEDURE — 93306 TTE W/DOPPLER COMPLETE: CPT

## 2021-04-15 ENCOUNTER — HOSPITAL ENCOUNTER (OUTPATIENT)
Dept: HOSPITAL 22 - ACC | Age: 66
Discharge: HOME | End: 2021-04-15
Payer: MEDICARE

## 2021-04-15 DIAGNOSIS — Z51.81: Primary | ICD-10-CM

## 2021-04-15 DIAGNOSIS — Z79.01: ICD-10-CM

## 2021-04-15 LAB — PHA INR FINGERSTICK: 2.1 (ref 0.9–1.1)

## 2021-04-15 PROCEDURE — G0463 HOSPITAL OUTPT CLINIC VISIT: HCPCS

## 2021-04-15 PROCEDURE — 85610 PROTHROMBIN TIME: CPT

## 2021-04-15 PROCEDURE — 99211 OFF/OP EST MAY X REQ PHY/QHP: CPT

## 2021-04-26 ENCOUNTER — HOSPITAL ENCOUNTER (OUTPATIENT)
Dept: HOSPITAL 22 - INF | Age: 66
Discharge: HOME | End: 2021-04-26
Payer: MEDICARE

## 2021-04-26 VITALS
DIASTOLIC BLOOD PRESSURE: 73 MMHG | TEMPERATURE: 97.16 F | HEART RATE: 70 BPM | RESPIRATION RATE: 18 BRPM | OXYGEN SATURATION: 97 % | SYSTOLIC BLOOD PRESSURE: 143 MMHG

## 2021-04-26 DIAGNOSIS — M81.0: ICD-10-CM

## 2021-04-26 DIAGNOSIS — M54.5: ICD-10-CM

## 2021-04-26 DIAGNOSIS — M54.9: Primary | ICD-10-CM

## 2021-04-26 DIAGNOSIS — M79.604: ICD-10-CM

## 2021-04-26 PROCEDURE — 73502 X-RAY EXAM HIP UNI 2-3 VIEWS: CPT

## 2021-04-26 PROCEDURE — 72110 X-RAY EXAM L-2 SPINE 4/>VWS: CPT

## 2021-04-26 PROCEDURE — 96372 THER/PROPH/DIAG INJ SC/IM: CPT

## 2021-05-06 ENCOUNTER — HOSPITAL ENCOUNTER (OUTPATIENT)
Age: 66
End: 2021-05-06
Payer: MEDICARE

## 2021-05-06 DIAGNOSIS — R42: Primary | ICD-10-CM

## 2021-05-06 PROCEDURE — 93270 REMOTE 30 DAY ECG REV/REPORT: CPT

## 2021-05-18 ENCOUNTER — HOSPITAL ENCOUNTER (OUTPATIENT)
Age: 66
End: 2021-05-18
Payer: MEDICARE

## 2021-05-18 DIAGNOSIS — J45.909: Primary | ICD-10-CM

## 2021-05-18 DIAGNOSIS — J45.909: ICD-10-CM

## 2021-05-18 DIAGNOSIS — R42: Primary | ICD-10-CM

## 2021-05-18 LAB
HCT VFR BLD CALC: 38.8 % (ref 37–47)
HGB BLD-MCNC: 12.6 G/DL (ref 12.2–16.2)
MCHC RBC-ENTMCNC: 32.6 G/DL (ref 31.8–35.4)
MCV RBC: 87.1 FL (ref 81–99)
MEAN CORPUSCULAR HEMOGLOBIN: 28.4 PG (ref 27–31.2)
PLATELET # BLD: 217 K/MM3 (ref 142–424)
RBC # BLD AUTO: 4.45 M/MM3 (ref 4.2–5.4)
WBC # BLD AUTO: 5.2 K/MM3 (ref 4.8–10.8)

## 2021-05-18 PROCEDURE — 36415 COLL VENOUS BLD VENIPUNCTURE: CPT

## 2021-05-18 PROCEDURE — 86682 HELMINTH ANTIBODY: CPT

## 2021-05-18 PROCEDURE — 93880 EXTRACRANIAL BILAT STUDY: CPT

## 2021-05-18 PROCEDURE — 86003 ALLG SPEC IGE CRUDE XTRC EA: CPT

## 2021-05-18 PROCEDURE — 85025 COMPLETE CBC W/AUTO DIFF WBC: CPT

## 2021-05-18 PROCEDURE — 82785 ASSAY OF IGE: CPT

## 2021-05-27 ENCOUNTER — HOSPITAL ENCOUNTER (OUTPATIENT)
Dept: HOSPITAL 22 - ACC | Age: 66
Discharge: HOME | End: 2021-05-27
Payer: MEDICARE

## 2021-05-27 DIAGNOSIS — Z79.01: Primary | ICD-10-CM

## 2021-05-27 LAB — PHA INR FINGERSTICK: 2.2 (ref 0.9–1.1)

## 2021-05-27 PROCEDURE — G0463 HOSPITAL OUTPT CLINIC VISIT: HCPCS

## 2021-05-27 PROCEDURE — 99211 OFF/OP EST MAY X REQ PHY/QHP: CPT

## 2021-05-27 PROCEDURE — 85610 PROTHROMBIN TIME: CPT

## 2021-07-05 ENCOUNTER — HOSPITAL ENCOUNTER (OUTPATIENT)
Dept: HOSPITAL 22 - ACC | Age: 66
Discharge: HOME | End: 2021-07-05
Payer: MEDICARE

## 2021-07-05 DIAGNOSIS — Z79.01: ICD-10-CM

## 2021-07-05 DIAGNOSIS — Z51.81: Primary | ICD-10-CM

## 2021-07-05 LAB — PHA INR FINGERSTICK: 2.7 (ref 0.9–1.1)

## 2021-07-05 PROCEDURE — 99211 OFF/OP EST MAY X REQ PHY/QHP: CPT

## 2021-07-05 PROCEDURE — 85610 PROTHROMBIN TIME: CPT

## 2021-07-05 PROCEDURE — G0463 HOSPITAL OUTPT CLINIC VISIT: HCPCS

## 2021-07-15 DIAGNOSIS — E11.9 TYPE 2 DIABETES MELLITUS TREATED WITH INSULIN (HCC): ICD-10-CM

## 2021-07-15 DIAGNOSIS — Z79.4 TYPE 2 DIABETES MELLITUS TREATED WITH INSULIN (HCC): ICD-10-CM

## 2021-07-15 RX ORDER — INSULIN GLARGINE 100 [IU]/ML
INJECTION, SOLUTION SUBCUTANEOUS
Qty: 45 ML | Refills: 2 | Status: SHIPPED | OUTPATIENT
Start: 2021-07-15

## 2021-08-05 ENCOUNTER — TELEPHONE (OUTPATIENT)
Dept: FAMILY MEDICINE CLINIC | Facility: CLINIC | Age: 66
End: 2021-08-05

## 2021-08-05 NOTE — TELEPHONE ENCOUNTER
Spoke with patient to see what she was given.  She was given a Z-jerel and a steroid taper.  I informed patient that this is the appropriate treatment and what our providers prescribe.

## 2021-08-05 NOTE — TELEPHONE ENCOUNTER
Caller: Kassandra Merritt    Relationship: Self    Best call back number: [phone]    What medication are you requesting: STRONG ANTIBIOTIC     What are your current symptoms: COVID-19     How long have you been experiencing symptoms: N/A     Have you had these symptoms before:    [x] Yes  [] No    Have you been treated for these symptoms before:   [x] Yes  [] No    If a prescription is needed, what is your preferred pharmacy and phone number: [id number] - [place], [place] - [address] [facility] [facility] - [phone] [facility] [phone] FX     Additional notes: PATIENT STATES SHE WAS SEEN AT THE URGENT CARE NEXT TO [facility] THIS MORNING (08/05/2021) AND TESTED POSITIVE FOR COVID. SHE STATES SHE HAS A FEW MEDICATION THAT WHERE CALLED INTO THE PHARMACY BUT SHE IS WANTING SOMETHING MUCH STRANGER TO TRY AND HELP WITH HER SYMPTOMS.

## 2021-08-07 ENCOUNTER — HOSPITAL ENCOUNTER (OUTPATIENT)
Age: 66
Discharge: HOME | End: 2021-08-07
Payer: MEDICARE

## 2021-08-07 VITALS — HEART RATE: 75 BPM | DIASTOLIC BLOOD PRESSURE: 57 MMHG | OXYGEN SATURATION: 93 % | SYSTOLIC BLOOD PRESSURE: 134 MMHG

## 2021-08-07 VITALS — BODY MASS INDEX: 43 KG/M2

## 2021-08-07 VITALS
OXYGEN SATURATION: 94 % | HEART RATE: 75 BPM | DIASTOLIC BLOOD PRESSURE: 58 MMHG | RESPIRATION RATE: 18 BRPM | SYSTOLIC BLOOD PRESSURE: 138 MMHG | TEMPERATURE: 99.32 F

## 2021-08-07 VITALS — HEART RATE: 80 BPM | DIASTOLIC BLOOD PRESSURE: 61 MMHG | SYSTOLIC BLOOD PRESSURE: 146 MMHG | OXYGEN SATURATION: 94 %

## 2021-08-07 VITALS — OXYGEN SATURATION: 95 % | SYSTOLIC BLOOD PRESSURE: 129 MMHG | HEART RATE: 91 BPM | DIASTOLIC BLOOD PRESSURE: 62 MMHG

## 2021-08-07 VITALS
DIASTOLIC BLOOD PRESSURE: 75 MMHG | HEART RATE: 81 BPM | TEMPERATURE: 99.68 F | OXYGEN SATURATION: 93 % | RESPIRATION RATE: 20 BRPM | SYSTOLIC BLOOD PRESSURE: 154 MMHG

## 2021-08-07 VITALS — HEART RATE: 74 BPM | OXYGEN SATURATION: 93 % | SYSTOLIC BLOOD PRESSURE: 138 MMHG | DIASTOLIC BLOOD PRESSURE: 62 MMHG

## 2021-08-07 DIAGNOSIS — Z79.01: ICD-10-CM

## 2021-08-07 DIAGNOSIS — Z51.81: ICD-10-CM

## 2021-08-07 DIAGNOSIS — U07.1: Primary | ICD-10-CM

## 2021-08-07 LAB
INR PPP: 1.33 (ref 0.9–1.1)
PT BLD: 15.4 SECONDS (ref 10.1–12.5)

## 2021-08-07 PROCEDURE — 96365 THER/PROPH/DIAG IV INF INIT: CPT

## 2021-08-07 PROCEDURE — 85610 PROTHROMBIN TIME: CPT

## 2021-08-23 ENCOUNTER — HOSPITAL ENCOUNTER (OUTPATIENT)
Age: 66
End: 2021-08-23
Payer: MEDICARE

## 2021-08-23 DIAGNOSIS — I82.403: ICD-10-CM

## 2021-08-23 DIAGNOSIS — U07.1: ICD-10-CM

## 2021-08-23 DIAGNOSIS — R06.02: Primary | ICD-10-CM

## 2021-08-23 LAB
ALBUMIN LEVEL: 3.7 G/DL (ref 3.5–5)
ALBUMIN/GLOB SERPL: 1.4 {RATIO} (ref 1.1–1.8)
ALP ISO SERPL-ACNC: 52 U/L (ref 38–126)
ALT SERPLBLD-CCNC: 66 U/L (ref 12–78)
ANION GAP SERPL CALC-SCNC: 14.4 MEQ/L (ref 5–15)
AST SERPL QL: 60 U/L (ref 14–36)
BILIRUBIN,TOTAL: 0.4 MG/DL (ref 0.2–1.3)
BUN SERPL-MCNC: 10 MG/DL (ref 7–17)
CALCIUM SPEC-MCNC: 9.3 MG/DL (ref 8.4–10.2)
CHLORIDE SPEC-SCNC: 103 MMOL/L (ref 98–107)
CO2 SERPL-SCNC: 25 MMOL/L (ref 22–30)
CREAT BLD-SCNC: 0.9 MG/DL (ref 0.52–1.04)
D-DIMER: 0.38 UG/ML (ref 0–0.5)
ESTIMATED GLOMERULAR FILT RATE: 63 ML/MIN (ref 60–?)
GFR (AFRICAN AMERICAN): 76 ML/MIN (ref 60–?)
GLOBULIN SER CALC-MCNC: 2.6 G/DL (ref 1.3–3.2)
GLUCOSE: 243 MG/DL (ref 74–100)
HCT VFR BLD CALC: 39.5 % (ref 37–47)
HGB BLD-MCNC: 12.7 G/DL (ref 12.2–16.2)
INR PPP: 3.44 (ref 0.9–1.1)
MCHC RBC-ENTMCNC: 32.3 G/DL (ref 31.8–35.4)
MCV RBC: 89.1 FL (ref 81–99)
MEAN CORPUSCULAR HEMOGLOBIN: 28.7 PG (ref 27–31.2)
NT PRO BRAIN NATRIURETIC PEP.: 504 PG/ML (ref 0–125)
PLATELET # BLD: 293 K/MM3 (ref 142–424)
POTASSIUM: 5.4 MMOL/L (ref 3.5–5.1)
PROT SERPL-MCNC: 6.3 G/DL (ref 6.3–8.2)
PT BLD: 37 SECONDS (ref 10.1–12.5)
RBC # BLD AUTO: 4.43 M/MM3 (ref 4.2–5.4)
SODIUM SPEC-SCNC: 137 MMOL/L (ref 136–145)
TROPONIN I: < 0.01 NG/ML (ref 0–0.03)
WBC # BLD AUTO: 5.1 K/MM3 (ref 4.8–10.8)

## 2021-08-23 PROCEDURE — 85651 RBC SED RATE NONAUTOMATED: CPT

## 2021-08-23 PROCEDURE — 83880 ASSAY OF NATRIURETIC PEPTIDE: CPT

## 2021-08-23 PROCEDURE — 36415 COLL VENOUS BLD VENIPUNCTURE: CPT

## 2021-08-23 PROCEDURE — 80053 COMPREHEN METABOLIC PANEL: CPT

## 2021-08-23 PROCEDURE — 85610 PROTHROMBIN TIME: CPT

## 2021-08-23 PROCEDURE — 71046 X-RAY EXAM CHEST 2 VIEWS: CPT

## 2021-08-23 PROCEDURE — 85378 FIBRIN DEGRADE SEMIQUANT: CPT

## 2021-08-23 PROCEDURE — 85025 COMPLETE CBC W/AUTO DIFF WBC: CPT

## 2021-08-23 PROCEDURE — 84484 ASSAY OF TROPONIN QUANT: CPT

## 2021-09-01 ENCOUNTER — HOSPITAL ENCOUNTER (OUTPATIENT)
Age: 66
End: 2021-09-01
Payer: MEDICARE

## 2021-09-01 DIAGNOSIS — Z86.16: ICD-10-CM

## 2021-09-01 DIAGNOSIS — R06.09: Primary | ICD-10-CM

## 2021-09-01 PROCEDURE — 93306 TTE W/DOPPLER COMPLETE: CPT

## 2021-09-03 ENCOUNTER — HOSPITAL ENCOUNTER (OUTPATIENT)
Age: 66
End: 2021-09-03
Payer: MEDICARE

## 2021-09-03 DIAGNOSIS — Z82.5: ICD-10-CM

## 2021-09-03 DIAGNOSIS — R06.00: ICD-10-CM

## 2021-09-03 DIAGNOSIS — D72.19: ICD-10-CM

## 2021-09-03 DIAGNOSIS — T78.40XA: ICD-10-CM

## 2021-09-03 DIAGNOSIS — J45.40: Primary | ICD-10-CM

## 2021-09-03 DIAGNOSIS — Z86.718: ICD-10-CM

## 2021-09-03 LAB
HCT VFR BLD CALC: 42.9 % (ref 37–47)
HGB BLD-MCNC: 13.4 G/DL (ref 12.2–16.2)
MCHC RBC-ENTMCNC: 31.3 G/DL (ref 31.8–35.4)
MCV RBC: 94.2 FL (ref 81–99)
MEAN CORPUSCULAR HEMOGLOBIN: 29.4 PG (ref 27–31.2)
PLATELET # BLD: 226 K/MM3 (ref 142–424)
RBC # BLD AUTO: 4.55 M/MM3 (ref 4.2–5.4)
WBC # BLD AUTO: 4.5 K/MM3 (ref 4.8–10.8)

## 2021-09-03 PROCEDURE — 86003 ALLG SPEC IGE CRUDE XTRC EA: CPT

## 2021-09-03 PROCEDURE — 36415 COLL VENOUS BLD VENIPUNCTURE: CPT

## 2021-09-03 PROCEDURE — 86682 HELMINTH ANTIBODY: CPT

## 2021-09-03 PROCEDURE — 85025 COMPLETE CBC W/AUTO DIFF WBC: CPT

## 2021-09-03 PROCEDURE — 82785 ASSAY OF IGE: CPT

## 2021-09-06 LAB
I006-IGE COCKROACH, GERMAN: <0.1 KU/L
T006-IGE CEDAR, MOUNTAIN: <0.1 KU/L
T007-IGE OAK, WHITE: <0.1 KU/L
T008-IGE ELM, AMERICAN: <0.1 KU/L
T015-IGE ASH, WHITE: <0.1 KU/L
T022-IGE PECAN, HICKORY: <0.1 KU/L
W001-IGE RAGWEED, SHORT: <0.1 KU/L
W011-IGE THISTLE, RUSSIAN: <0.1 KU/L
W014-IGE PIGWEED, COMMON: <0.1 KU/L

## 2021-09-23 ENCOUNTER — HOSPITAL ENCOUNTER (OUTPATIENT)
Age: 66
End: 2021-09-23
Payer: MEDICARE

## 2021-09-23 DIAGNOSIS — E11.22: ICD-10-CM

## 2021-09-23 DIAGNOSIS — Z79.01: ICD-10-CM

## 2021-09-23 DIAGNOSIS — E78.2: ICD-10-CM

## 2021-09-23 DIAGNOSIS — N18.2: Primary | ICD-10-CM

## 2021-09-23 DIAGNOSIS — Z51.81: ICD-10-CM

## 2021-09-23 DIAGNOSIS — Z79.4: ICD-10-CM

## 2021-09-23 LAB
ALBUMIN LEVEL: 4.3 G/DL (ref 3.5–5)
ALBUMIN/GLOB SERPL: 1.7 {RATIO} (ref 1.1–1.8)
ALP ISO SERPL-ACNC: 59 U/L (ref 38–126)
ALT SERPLBLD-CCNC: 91 U/L (ref 12–78)
ANION GAP SERPL CALC-SCNC: 14.2 MEQ/L (ref 5–15)
AST SERPL QL: 111 U/L (ref 14–36)
BILIRUBIN,TOTAL: 0.2 MG/DL (ref 0.2–1.3)
BUN SERPL-MCNC: 14 MG/DL (ref 7–17)
CALCIUM SPEC-MCNC: 9.5 MG/DL (ref 8.4–10.2)
CHLORIDE SPEC-SCNC: 102 MMOL/L (ref 98–107)
CHOLEST SPEC-SCNC: 220 MG/DL (ref 140–200)
CO2 SERPL-SCNC: 27 MMOL/L (ref 22–30)
CREAT BLD-SCNC: 0.9 MG/DL (ref 0.52–1.04)
ESTIMATED GLOMERULAR FILT RATE: 63 ML/MIN (ref 60–?)
GFR (AFRICAN AMERICAN): 76 ML/MIN (ref 60–?)
GLOBULIN SER CALC-MCNC: 2.6 G/DL (ref 1.3–3.2)
GLUCOSE: 194 MG/DL (ref 74–100)
HBA1C MFR BLD: 8.5 % (ref 4–6)
HCT VFR BLD CALC: 43.6 % (ref 37–47)
HDLC SERPL-MCNC: 42 MG/DL (ref 40–60)
HGB BLD-MCNC: 13.5 G/DL (ref 12.2–16.2)
INR PPP: 2.39 (ref 0.9–1.1)
MCHC RBC-ENTMCNC: 31.1 G/DL (ref 31.8–35.4)
MCV RBC: 95.3 FL (ref 81–99)
MEAN CORPUSCULAR HEMOGLOBIN: 29.6 PG (ref 27–31.2)
PLATELET # BLD: 255 K/MM3 (ref 142–424)
POTASSIUM: 5.2 MMOL/L (ref 3.5–5.1)
PROT SERPL-MCNC: 6.9 G/DL (ref 6.3–8.2)
PT BLD: 26.4 SECONDS (ref 10.1–12.5)
RBC # BLD AUTO: 4.58 M/MM3 (ref 4.2–5.4)
SODIUM SPEC-SCNC: 138 MMOL/L (ref 136–145)
TRIGLYCERIDES: 187 MG/DL (ref 30–150)
WBC # BLD AUTO: 5.6 K/MM3 (ref 4.8–10.8)

## 2021-09-23 PROCEDURE — 85610 PROTHROMBIN TIME: CPT

## 2021-09-23 PROCEDURE — 36415 COLL VENOUS BLD VENIPUNCTURE: CPT

## 2021-09-23 PROCEDURE — 80061 LIPID PANEL: CPT

## 2021-09-23 PROCEDURE — 83036 HEMOGLOBIN GLYCOSYLATED A1C: CPT

## 2021-09-23 PROCEDURE — 85025 COMPLETE CBC W/AUTO DIFF WBC: CPT

## 2021-09-23 PROCEDURE — 80053 COMPREHEN METABOLIC PANEL: CPT

## 2021-11-04 ENCOUNTER — HOSPITAL ENCOUNTER (OUTPATIENT)
Dept: HOSPITAL 22 - LAB | Age: 66
Discharge: HOME | End: 2021-11-04
Payer: MEDICARE

## 2021-11-04 VITALS
SYSTOLIC BLOOD PRESSURE: 160 MMHG | HEART RATE: 67 BPM | RESPIRATION RATE: 18 BRPM | OXYGEN SATURATION: 97 % | DIASTOLIC BLOOD PRESSURE: 69 MMHG | TEMPERATURE: 96.44 F

## 2021-11-04 DIAGNOSIS — Z51.81: ICD-10-CM

## 2021-11-04 DIAGNOSIS — N18.2: ICD-10-CM

## 2021-11-04 DIAGNOSIS — E11.22: Primary | ICD-10-CM

## 2021-11-04 DIAGNOSIS — M81.0: ICD-10-CM

## 2021-11-04 DIAGNOSIS — Z79.01: ICD-10-CM

## 2021-11-04 DIAGNOSIS — E78.2: ICD-10-CM

## 2021-11-04 LAB
ALBUMIN LEVEL: 4.4 G/DL (ref 3.5–5)
ALBUMIN/GLOB SERPL: 1.5 {RATIO} (ref 1.1–1.8)
ALP ISO SERPL-ACNC: 58 U/L (ref 38–126)
ALT SERPLBLD-CCNC: 83 U/L (ref 12–78)
ANION GAP SERPL CALC-SCNC: 11.7 MEQ/L (ref 5–15)
AST SERPL QL: 90 U/L (ref 14–36)
BILIRUBIN,TOTAL: 0.4 MG/DL (ref 0.2–1.3)
BUN SERPL-MCNC: 10 MG/DL (ref 7–17)
CALCIUM SPEC-MCNC: 9.9 MG/DL (ref 8.4–10.2)
CHLORIDE SPEC-SCNC: 103 MMOL/L (ref 98–107)
CHOLEST SPEC-SCNC: 220 MG/DL (ref 140–200)
CO2 SERPL-SCNC: 28 MMOL/L (ref 22–30)
CREAT BLD-SCNC: 0.9 MG/DL (ref 0.52–1.04)
ESTIMATED GLOMERULAR FILT RATE: 63 ML/MIN (ref 60–?)
GFR (AFRICAN AMERICAN): 76 ML/MIN (ref 60–?)
GLOBULIN SER CALC-MCNC: 3 G/DL (ref 1.3–3.2)
GLUCOSE: 87 MG/DL (ref 74–100)
HBA1C MFR BLD: 8.2 % (ref 4–6)
HCT VFR BLD CALC: 45 % (ref 37–47)
HDLC SERPL-MCNC: 42 MG/DL (ref 40–60)
HGB BLD-MCNC: 14.1 G/DL (ref 12.2–16.2)
INR PPP: 1.99 (ref 0.9–1.1)
MCHC RBC-ENTMCNC: 31.3 G/DL (ref 31.8–35.4)
MCV RBC: 93.4 FL (ref 81–99)
MEAN CORPUSCULAR HEMOGLOBIN: 29.3 PG (ref 27–31.2)
PLATELET # BLD: 247 K/MM3 (ref 142–424)
POTASSIUM: 4.7 MMOL/L (ref 3.5–5.1)
PROT SERPL-MCNC: 7.4 G/DL (ref 6.3–8.2)
PT BLD: 21.4 SECONDS (ref 10.1–12.5)
RBC # BLD AUTO: 4.82 M/MM3 (ref 4.2–5.4)
SODIUM SPEC-SCNC: 138 MMOL/L (ref 136–145)
TRIGLYCERIDES: 148 MG/DL (ref 30–150)
WBC # BLD AUTO: 6.2 K/MM3 (ref 4.8–10.8)

## 2021-11-04 PROCEDURE — 96372 THER/PROPH/DIAG INJ SC/IM: CPT

## 2021-11-04 PROCEDURE — 36415 COLL VENOUS BLD VENIPUNCTURE: CPT

## 2021-11-04 PROCEDURE — 80061 LIPID PANEL: CPT

## 2021-11-04 PROCEDURE — 83036 HEMOGLOBIN GLYCOSYLATED A1C: CPT

## 2021-11-04 PROCEDURE — 85610 PROTHROMBIN TIME: CPT

## 2021-11-04 PROCEDURE — 85025 COMPLETE CBC W/AUTO DIFF WBC: CPT

## 2021-11-04 PROCEDURE — 80053 COMPREHEN METABOLIC PANEL: CPT

## 2021-12-15 ENCOUNTER — HOSPITAL ENCOUNTER (OUTPATIENT)
Dept: HOSPITAL 22 - ACC | Age: 66
Discharge: HOME | End: 2021-12-15
Payer: MEDICARE

## 2021-12-15 DIAGNOSIS — Z79.01: ICD-10-CM

## 2021-12-15 DIAGNOSIS — Z51.81: Primary | ICD-10-CM

## 2021-12-15 LAB — PHA INR FINGERSTICK: 2.3 (ref 0.9–1.1)

## 2021-12-15 PROCEDURE — 85610 PROTHROMBIN TIME: CPT

## 2021-12-15 PROCEDURE — G0463 HOSPITAL OUTPT CLINIC VISIT: HCPCS

## 2021-12-15 PROCEDURE — 99211 OFF/OP EST MAY X REQ PHY/QHP: CPT

## 2021-12-27 ENCOUNTER — HOSPITAL ENCOUNTER (OUTPATIENT)
Age: 66
End: 2021-12-27
Payer: MEDICARE

## 2021-12-27 DIAGNOSIS — M79.89: Primary | ICD-10-CM

## 2021-12-27 PROCEDURE — 76536 US EXAM OF HEAD AND NECK: CPT

## 2022-01-10 ENCOUNTER — HOSPITAL ENCOUNTER (OUTPATIENT)
Age: 67
End: 2022-01-10
Payer: MEDICARE

## 2022-01-10 DIAGNOSIS — J44.9: Primary | ICD-10-CM

## 2022-01-10 DIAGNOSIS — J45.909: ICD-10-CM

## 2022-01-10 PROCEDURE — 36415 COLL VENOUS BLD VENIPUNCTURE: CPT

## 2022-01-10 PROCEDURE — 86003 ALLG SPEC IGE CRUDE XTRC EA: CPT

## 2022-01-31 ENCOUNTER — HOSPITAL ENCOUNTER (OUTPATIENT)
Dept: HOSPITAL 22 - ACC | Age: 67
Discharge: HOME | End: 2022-01-31
Payer: MEDICARE

## 2022-01-31 DIAGNOSIS — Z79.01: ICD-10-CM

## 2022-01-31 DIAGNOSIS — Z51.81: Primary | ICD-10-CM

## 2022-01-31 LAB — PHA INR FINGERSTICK: 2.6 (ref 0.9–1.1)

## 2022-01-31 PROCEDURE — G0463 HOSPITAL OUTPT CLINIC VISIT: HCPCS

## 2022-01-31 PROCEDURE — 99211 OFF/OP EST MAY X REQ PHY/QHP: CPT

## 2022-01-31 PROCEDURE — 85610 PROTHROMBIN TIME: CPT

## 2022-03-11 ENCOUNTER — HOSPITAL ENCOUNTER (OUTPATIENT)
Dept: HOSPITAL 22 - ACC | Age: 67
Discharge: HOME | End: 2022-03-11
Payer: MEDICARE

## 2022-03-11 DIAGNOSIS — Z79.01: ICD-10-CM

## 2022-03-11 DIAGNOSIS — Z51.81: Primary | ICD-10-CM

## 2022-03-11 LAB — PHA INR FINGERSTICK: 2.2 (ref 0.9–1.1)

## 2022-03-11 PROCEDURE — G0463 HOSPITAL OUTPT CLINIC VISIT: HCPCS

## 2022-03-11 PROCEDURE — 85610 PROTHROMBIN TIME: CPT

## 2022-03-11 PROCEDURE — 99211 OFF/OP EST MAY X REQ PHY/QHP: CPT

## 2022-04-22 ENCOUNTER — HOSPITAL ENCOUNTER (OUTPATIENT)
Dept: HOSPITAL 22 - ACC | Age: 67
Discharge: HOME | End: 2022-04-22
Payer: MEDICARE

## 2022-04-22 DIAGNOSIS — Z79.01: ICD-10-CM

## 2022-04-22 DIAGNOSIS — Z51.81: Primary | ICD-10-CM

## 2022-04-22 LAB — PHA INR FINGERSTICK: 2.2 (ref 0.9–1.1)

## 2022-04-22 PROCEDURE — G0463 HOSPITAL OUTPT CLINIC VISIT: HCPCS

## 2022-04-22 PROCEDURE — 99211 OFF/OP EST MAY X REQ PHY/QHP: CPT

## 2022-04-22 PROCEDURE — 85610 PROTHROMBIN TIME: CPT

## 2022-05-05 ENCOUNTER — HOSPITAL ENCOUNTER (OUTPATIENT)
Dept: HOSPITAL 22 - INF | Age: 67
Discharge: HOME | End: 2022-05-05
Payer: MEDICARE

## 2022-05-05 VITALS
RESPIRATION RATE: 18 BRPM | SYSTOLIC BLOOD PRESSURE: 129 MMHG | DIASTOLIC BLOOD PRESSURE: 72 MMHG | TEMPERATURE: 97.34 F | HEART RATE: 67 BPM | OXYGEN SATURATION: 98 %

## 2022-05-05 VITALS
RESPIRATION RATE: 16 BRPM | TEMPERATURE: 97.4 F | SYSTOLIC BLOOD PRESSURE: 124 MMHG | OXYGEN SATURATION: 98 % | DIASTOLIC BLOOD PRESSURE: 69 MMHG | HEART RATE: 70 BPM

## 2022-05-05 DIAGNOSIS — M81.0: Primary | ICD-10-CM

## 2022-05-05 PROCEDURE — 96372 THER/PROPH/DIAG INJ SC/IM: CPT

## 2022-05-14 ENCOUNTER — HOSPITAL ENCOUNTER (OUTPATIENT)
Age: 67
End: 2022-05-14
Payer: MEDICARE

## 2022-05-14 DIAGNOSIS — E78.5: ICD-10-CM

## 2022-05-14 DIAGNOSIS — I10: Primary | ICD-10-CM

## 2022-05-14 LAB
ANION GAP SERPL CALC-SCNC: 12.1 MEQ/L (ref 5–15)
BUN SERPL-MCNC: 16 MG/DL (ref 7–17)
CALCIUM SPEC-MCNC: 9.6 MG/DL (ref 8.4–10.2)
CHLORIDE SPEC-SCNC: 102 MMOL/L (ref 98–107)
CO2 SERPL-SCNC: 26 MMOL/L (ref 22–30)
CREAT BLD-SCNC: 0.9 MG/DL (ref 0.52–1.04)
ESTIMATED GLOMERULAR FILT RATE: 63 ML/MIN (ref 60–?)
GFR (AFRICAN AMERICAN): 76 ML/MIN (ref 60–?)
GLUCOSE: 216 MG/DL (ref 74–100)
POTASSIUM: 5.1 MMOL/L (ref 3.5–5.1)
SODIUM SPEC-SCNC: 135 MMOL/L (ref 136–145)

## 2022-05-14 PROCEDURE — 36415 COLL VENOUS BLD VENIPUNCTURE: CPT

## 2022-05-14 PROCEDURE — 80048 BASIC METABOLIC PNL TOTAL CA: CPT

## 2022-05-23 ENCOUNTER — HOSPITAL ENCOUNTER (OUTPATIENT)
Age: 67
End: 2022-05-23
Payer: MEDICARE

## 2022-05-23 DIAGNOSIS — Z12.31: ICD-10-CM

## 2022-05-23 DIAGNOSIS — Z78.0: Primary | ICD-10-CM

## 2022-05-23 PROCEDURE — 77067 SCR MAMMO BI INCL CAD: CPT

## 2022-05-23 PROCEDURE — 77080 DXA BONE DENSITY AXIAL: CPT

## 2022-05-23 PROCEDURE — 77063 BREAST TOMOSYNTHESIS BI: CPT

## 2022-06-07 ENCOUNTER — HOSPITAL ENCOUNTER (OUTPATIENT)
Dept: HOSPITAL 22 - ACC | Age: 67
Discharge: HOME | End: 2022-06-07
Payer: MEDICARE

## 2022-06-07 DIAGNOSIS — Z79.01: ICD-10-CM

## 2022-06-07 DIAGNOSIS — Z51.81: Primary | ICD-10-CM

## 2022-06-07 LAB — PHA INR FINGERSTICK: 2.4 (ref 0.9–1.1)

## 2022-06-07 PROCEDURE — 85610 PROTHROMBIN TIME: CPT

## 2022-06-07 PROCEDURE — 99211 OFF/OP EST MAY X REQ PHY/QHP: CPT

## 2022-06-07 PROCEDURE — G0463 HOSPITAL OUTPT CLINIC VISIT: HCPCS

## 2022-07-15 ENCOUNTER — HOSPITAL ENCOUNTER (OUTPATIENT)
Dept: HOSPITAL 22 - ACC | Age: 67
Discharge: HOME | End: 2022-07-15
Payer: MEDICARE

## 2022-07-15 DIAGNOSIS — Z79.01: ICD-10-CM

## 2022-07-15 DIAGNOSIS — Z51.81: Primary | ICD-10-CM

## 2022-07-15 LAB — PHA INR FINGERSTICK: 2.1 (ref 0.9–1.1)

## 2022-07-15 PROCEDURE — 85610 PROTHROMBIN TIME: CPT

## 2022-07-15 PROCEDURE — 99211 OFF/OP EST MAY X REQ PHY/QHP: CPT

## 2022-07-15 PROCEDURE — G0463 HOSPITAL OUTPT CLINIC VISIT: HCPCS

## 2022-08-18 ENCOUNTER — HOSPITAL ENCOUNTER (OUTPATIENT)
Dept: HOSPITAL 22 - ACC | Age: 67
Discharge: HOME | End: 2022-08-18
Payer: MEDICARE

## 2022-08-18 DIAGNOSIS — Z51.81: Primary | ICD-10-CM

## 2022-08-18 DIAGNOSIS — Z79.01: ICD-10-CM

## 2022-08-18 LAB — PHA INR FINGERSTICK: 2.1 (ref 0.9–1.1)

## 2022-08-18 PROCEDURE — 99211 OFF/OP EST MAY X REQ PHY/QHP: CPT

## 2022-08-18 PROCEDURE — 85610 PROTHROMBIN TIME: CPT

## 2022-08-18 PROCEDURE — G0463 HOSPITAL OUTPT CLINIC VISIT: HCPCS

## 2022-09-29 ENCOUNTER — HOSPITAL ENCOUNTER (OUTPATIENT)
Dept: HOSPITAL 22 - ACC | Age: 67
End: 2022-09-29
Payer: MEDICARE

## 2022-09-29 DIAGNOSIS — Z79.01: ICD-10-CM

## 2022-09-29 DIAGNOSIS — Z51.81: Primary | ICD-10-CM

## 2022-09-29 LAB — PHA INR FINGERSTICK: 2.1 (ref 0.9–1.1)

## 2022-09-29 PROCEDURE — G0463 HOSPITAL OUTPT CLINIC VISIT: HCPCS

## 2022-09-29 PROCEDURE — 85610 PROTHROMBIN TIME: CPT

## 2022-09-29 PROCEDURE — 99211 OFF/OP EST MAY X REQ PHY/QHP: CPT

## 2022-11-10 ENCOUNTER — HOSPITAL ENCOUNTER (OUTPATIENT)
Dept: HOSPITAL 22 - ACC | Age: 67
End: 2022-11-10
Payer: MEDICARE

## 2022-11-10 DIAGNOSIS — Z79.01: ICD-10-CM

## 2022-11-10 DIAGNOSIS — Z51.81: Primary | ICD-10-CM

## 2022-11-10 LAB — PHA INR FINGERSTICK: 2.4 (ref 0.9–1.1)

## 2022-11-10 PROCEDURE — 99211 OFF/OP EST MAY X REQ PHY/QHP: CPT

## 2022-11-10 PROCEDURE — G0463 HOSPITAL OUTPT CLINIC VISIT: HCPCS

## 2022-11-10 PROCEDURE — 85610 PROTHROMBIN TIME: CPT

## 2022-12-21 ENCOUNTER — HOSPITAL ENCOUNTER (OUTPATIENT)
Age: 67
End: 2022-12-21
Payer: MEDICARE

## 2022-12-21 DIAGNOSIS — Z51.81: Primary | ICD-10-CM

## 2022-12-21 DIAGNOSIS — Z79.01: ICD-10-CM

## 2022-12-21 LAB — PHA INR FINGERSTICK: 2.9 (ref 0.9–1.1)

## 2022-12-21 PROCEDURE — G0463 HOSPITAL OUTPT CLINIC VISIT: HCPCS

## 2022-12-21 PROCEDURE — 99211 OFF/OP EST MAY X REQ PHY/QHP: CPT

## 2022-12-21 PROCEDURE — 85610 PROTHROMBIN TIME: CPT

## 2023-01-13 ENCOUNTER — HOSPITAL ENCOUNTER (OUTPATIENT)
Dept: HOSPITAL 22 - ACC | Age: 68
End: 2023-01-13
Payer: MEDICARE

## 2023-01-13 DIAGNOSIS — Z51.81: Primary | ICD-10-CM

## 2023-01-13 DIAGNOSIS — Z79.01: ICD-10-CM

## 2023-01-13 LAB — PHA INR FINGERSTICK: 2.8 (ref 0.9–1.1)

## 2023-01-13 PROCEDURE — G0463 HOSPITAL OUTPT CLINIC VISIT: HCPCS

## 2023-01-13 PROCEDURE — 85610 PROTHROMBIN TIME: CPT

## 2023-01-13 PROCEDURE — 99211 OFF/OP EST MAY X REQ PHY/QHP: CPT

## 2023-02-10 ENCOUNTER — HOSPITAL ENCOUNTER (OUTPATIENT)
Age: 68
End: 2023-02-10
Payer: MEDICARE

## 2023-02-10 DIAGNOSIS — Z51.81: Primary | ICD-10-CM

## 2023-02-10 DIAGNOSIS — Z79.01: ICD-10-CM

## 2023-02-10 LAB — PHA INR FINGERSTICK: 2.8 (ref 0.9–1.1)

## 2023-02-10 PROCEDURE — 99211 OFF/OP EST MAY X REQ PHY/QHP: CPT

## 2023-02-10 PROCEDURE — G0463 HOSPITAL OUTPT CLINIC VISIT: HCPCS

## 2023-02-10 PROCEDURE — 85610 PROTHROMBIN TIME: CPT

## 2023-03-14 ENCOUNTER — HOSPITAL ENCOUNTER (OUTPATIENT)
Dept: HOSPITAL 22 - INF | Age: 68
Discharge: HOME | End: 2023-03-14
Payer: MEDICARE

## 2023-03-14 VITALS
SYSTOLIC BLOOD PRESSURE: 147 MMHG | HEART RATE: 72 BPM | TEMPERATURE: 97.88 F | RESPIRATION RATE: 18 BRPM | OXYGEN SATURATION: 97 % | DIASTOLIC BLOOD PRESSURE: 71 MMHG

## 2023-03-14 VITALS
HEART RATE: 70 BPM | OXYGEN SATURATION: 97 % | SYSTOLIC BLOOD PRESSURE: 144 MMHG | RESPIRATION RATE: 18 BRPM | DIASTOLIC BLOOD PRESSURE: 73 MMHG

## 2023-03-14 VITALS — BODY MASS INDEX: 37 KG/M2

## 2023-03-14 DIAGNOSIS — M81.0: Primary | ICD-10-CM

## 2023-03-14 LAB
ALBUMIN LEVEL: 4.4 G/DL (ref 3.5–5)
CALCIUM SPEC-MCNC: 9.5 MG/DL (ref 8.4–10.2)
CREAT BLD-SCNC: 1 MG/DL (ref 0.52–1.04)
CREATININE CLEARANCE ESTIMATED: 87 ML/MIN (ref 50–200)
ESTIMATED GLOMERULAR FILT RATE: 55 ML/MIN (ref 60–?)
GFR (AFRICAN AMERICAN): 67 ML/MIN (ref 60–?)

## 2023-03-14 PROCEDURE — 82310 ASSAY OF CALCIUM: CPT

## 2023-03-14 PROCEDURE — 82565 ASSAY OF CREATININE: CPT

## 2023-03-14 PROCEDURE — 82040 ASSAY OF SERUM ALBUMIN: CPT

## 2023-03-14 PROCEDURE — 96374 THER/PROPH/DIAG INJ IV PUSH: CPT

## 2023-03-30 ENCOUNTER — HOSPITAL ENCOUNTER (OUTPATIENT)
Dept: HOSPITAL 22 - ACC | Age: 68
End: 2023-03-30
Payer: MEDICARE

## 2023-03-30 DIAGNOSIS — Z51.81: Primary | ICD-10-CM

## 2023-03-30 DIAGNOSIS — Z79.01: ICD-10-CM

## 2023-03-30 LAB — PHA INR FINGERSTICK: 2.9 (ref 0.9–1.1)

## 2023-03-30 PROCEDURE — G0463 HOSPITAL OUTPT CLINIC VISIT: HCPCS

## 2023-03-30 PROCEDURE — 85610 PROTHROMBIN TIME: CPT

## 2023-03-30 PROCEDURE — 99211 OFF/OP EST MAY X REQ PHY/QHP: CPT

## 2023-04-10 ENCOUNTER — HOSPITAL ENCOUNTER (OUTPATIENT)
Age: 68
End: 2023-04-10
Payer: MEDICARE

## 2023-04-10 DIAGNOSIS — G47.10: ICD-10-CM

## 2023-04-10 DIAGNOSIS — R06.83: ICD-10-CM

## 2023-04-10 DIAGNOSIS — G47.30: Primary | ICD-10-CM

## 2023-04-10 PROCEDURE — G0399 HOME SLEEP TEST/TYPE 3 PORTA: HCPCS

## 2023-05-16 ENCOUNTER — HOSPITAL ENCOUNTER (OUTPATIENT)
Dept: HOSPITAL 22 - ACC | Age: 68
End: 2023-05-16
Payer: MEDICARE

## 2023-05-16 DIAGNOSIS — Z79.01: ICD-10-CM

## 2023-05-16 DIAGNOSIS — Z51.81: Primary | ICD-10-CM

## 2023-05-16 LAB — PHA INR FINGERSTICK: 3.3 (ref 0.9–1.1)

## 2023-05-16 PROCEDURE — 99211 OFF/OP EST MAY X REQ PHY/QHP: CPT

## 2023-05-16 PROCEDURE — G0463 HOSPITAL OUTPT CLINIC VISIT: HCPCS

## 2023-05-16 PROCEDURE — 85610 PROTHROMBIN TIME: CPT

## 2023-05-24 ENCOUNTER — HOSPITAL ENCOUNTER (OUTPATIENT)
Age: 68
End: 2023-05-24
Payer: MEDICARE

## 2023-05-24 DIAGNOSIS — Z12.31: ICD-10-CM

## 2023-05-24 DIAGNOSIS — Z78.0: Primary | ICD-10-CM

## 2023-05-24 PROCEDURE — 77080 DXA BONE DENSITY AXIAL: CPT

## 2023-05-24 PROCEDURE — 77063 BREAST TOMOSYNTHESIS BI: CPT

## 2023-05-24 PROCEDURE — 77067 SCR MAMMO BI INCL CAD: CPT

## 2023-06-06 ENCOUNTER — HOSPITAL ENCOUNTER (OUTPATIENT)
Dept: HOSPITAL 22 - ACC | Age: 68
End: 2023-06-06
Payer: MEDICARE

## 2023-06-06 DIAGNOSIS — Z51.81: Primary | ICD-10-CM

## 2023-06-06 DIAGNOSIS — Z79.01: ICD-10-CM

## 2023-06-06 LAB — PHA INR FINGERSTICK: 2.7 (ref 0.9–1.1)

## 2023-06-06 PROCEDURE — G0463 HOSPITAL OUTPT CLINIC VISIT: HCPCS

## 2023-06-06 PROCEDURE — 99211 OFF/OP EST MAY X REQ PHY/QHP: CPT

## 2023-06-06 PROCEDURE — 85610 PROTHROMBIN TIME: CPT

## 2023-07-18 ENCOUNTER — HOSPITAL ENCOUNTER (OUTPATIENT)
Dept: HOSPITAL 22 - ACC | Age: 68
End: 2023-07-18
Payer: MEDICARE

## 2023-07-18 DIAGNOSIS — Z79.01: Primary | ICD-10-CM

## 2023-07-18 DIAGNOSIS — Z51.81: ICD-10-CM

## 2023-07-18 LAB — PHA INR FINGERSTICK: 2.1 (ref 0.9–1.1)

## 2023-07-18 PROCEDURE — 99211 OFF/OP EST MAY X REQ PHY/QHP: CPT

## 2023-07-18 PROCEDURE — 85610 PROTHROMBIN TIME: CPT

## 2023-07-18 PROCEDURE — G0463 HOSPITAL OUTPT CLINIC VISIT: HCPCS

## 2023-08-29 ENCOUNTER — HOSPITAL ENCOUNTER (OUTPATIENT)
Dept: HOSPITAL 22 - ACC | Age: 68
End: 2023-08-29
Payer: MEDICARE

## 2023-08-29 DIAGNOSIS — Z79.01: Primary | ICD-10-CM

## 2023-08-29 DIAGNOSIS — Z51.81: ICD-10-CM

## 2023-08-29 LAB — PHA INR FINGERSTICK: 3.1 (ref 0.9–1.1)

## 2023-08-29 PROCEDURE — G0463 HOSPITAL OUTPT CLINIC VISIT: HCPCS

## 2023-08-29 PROCEDURE — 85610 PROTHROMBIN TIME: CPT

## 2023-08-29 PROCEDURE — 99211 OFF/OP EST MAY X REQ PHY/QHP: CPT

## 2023-09-26 ENCOUNTER — HOSPITAL ENCOUNTER (OUTPATIENT)
Dept: HOSPITAL 22 - ACC | Age: 68
End: 2023-09-26
Payer: MEDICARE

## 2023-09-26 DIAGNOSIS — Z51.81: ICD-10-CM

## 2023-09-26 DIAGNOSIS — Z79.01: Primary | ICD-10-CM

## 2023-09-26 LAB — PHA INR FINGERSTICK: 2.5 (ref 0.9–1.1)

## 2023-09-26 PROCEDURE — G0463 HOSPITAL OUTPT CLINIC VISIT: HCPCS

## 2023-09-26 PROCEDURE — 85610 PROTHROMBIN TIME: CPT

## 2023-09-26 PROCEDURE — 99211 OFF/OP EST MAY X REQ PHY/QHP: CPT

## 2023-11-07 ENCOUNTER — HOSPITAL ENCOUNTER (OUTPATIENT)
Age: 68
End: 2023-11-07
Payer: MEDICARE

## 2023-11-07 DIAGNOSIS — Z79.84: ICD-10-CM

## 2023-11-07 DIAGNOSIS — Z79.4: ICD-10-CM

## 2023-11-07 DIAGNOSIS — E11.22: ICD-10-CM

## 2023-11-07 DIAGNOSIS — Z79.01: Primary | ICD-10-CM

## 2023-11-07 DIAGNOSIS — E53.8: ICD-10-CM

## 2023-11-07 DIAGNOSIS — E55.9: ICD-10-CM

## 2023-11-07 DIAGNOSIS — N18.9: ICD-10-CM

## 2023-11-07 LAB
25-OH VITAMIN D, TOTAL: 77.1 NG/ML (ref 30–100)
ALBUMIN LEVEL: 4.1 G/DL (ref 3.5–5)
ALBUMIN/GLOB SERPL: 1.3 {RATIO} (ref 1.1–1.8)
ALP ISO SERPL-ACNC: 54 U/L (ref 38–126)
ALT SERPLBLD-CCNC: 69 U/L (ref 12–78)
ANION GAP SERPL CALC-SCNC: 15.3 MEQ/L (ref 5–15)
AST SERPL QL: 99 U/L (ref 14–36)
BILIRUBIN,TOTAL: 0.4 MG/DL (ref 0.2–1.3)
BUN SERPL-MCNC: 16 MG/DL (ref 7–17)
CALCIUM SPEC-MCNC: 9.7 MG/DL (ref 8.4–10.2)
CHLORIDE SPEC-SCNC: 103 MMOL/L (ref 98–107)
CHOLEST SPEC-SCNC: 210 MG/DL (ref 140–200)
CO2 SERPL-SCNC: 26 MMOL/L (ref 22–30)
CREAT BLD-SCNC: 0.9 MG/DL (ref 0.52–1.04)
ESTIMATED GLOMERULAR FILT RATE: 62 ML/MIN (ref 60–?)
GFR (AFRICAN AMERICAN): 75 ML/MIN (ref 60–?)
GLOBULIN SER CALC-MCNC: 3.1 G/DL (ref 1.3–3.2)
GLUCOSE: 168 MG/DL (ref 74–100)
HBA1C MFR BLD: 7.6 % (ref 4–6)
HCT VFR BLD CALC: 37.1 % (ref 37–47)
HDLC SERPL-MCNC: 35 MG/DL (ref 40–60)
HGB BLD-MCNC: 12.3 G/DL (ref 12.2–16.2)
INR PPP: 2.87 (ref 0.9–1.1)
MCHC RBC-ENTMCNC: 33.3 G/DL (ref 31.8–35.4)
MCV RBC: 86.8 FL (ref 81–99)
MEAN CORPUSCULAR HEMOGLOBIN: 28.9 PG (ref 27–31.2)
PLATELET # BLD: 217 K/MM3 (ref 142–424)
POTASSIUM: 4.3 MMOL/L (ref 3.5–5.1)
PROT SERPL-MCNC: 7.2 G/DL (ref 6.3–8.2)
PT BLD: 28.9 SECONDS (ref 10.1–12.5)
RBC # BLD AUTO: 4.27 M/MM3 (ref 4.2–5.4)
SODIUM SPEC-SCNC: 140 MMOL/L (ref 136–145)
TRIGLYCERIDES: 173 MG/DL (ref 30–150)
VITAMIN B12: 547 PG/ML (ref 239–931)
WBC # BLD AUTO: 5 K/MM3 (ref 4.8–10.8)

## 2023-11-07 PROCEDURE — 82306 VITAMIN D 25 HYDROXY: CPT

## 2023-11-07 PROCEDURE — 82607 VITAMIN B-12: CPT

## 2023-11-07 PROCEDURE — 36415 COLL VENOUS BLD VENIPUNCTURE: CPT

## 2023-11-07 PROCEDURE — 82570 ASSAY OF URINE CREATININE: CPT

## 2023-11-07 PROCEDURE — 82043 UR ALBUMIN QUANTITATIVE: CPT

## 2023-11-07 PROCEDURE — 80053 COMPREHEN METABOLIC PANEL: CPT

## 2023-11-07 PROCEDURE — 83036 HEMOGLOBIN GLYCOSYLATED A1C: CPT

## 2023-11-07 PROCEDURE — 85610 PROTHROMBIN TIME: CPT

## 2023-11-07 PROCEDURE — 80061 LIPID PANEL: CPT

## 2023-11-07 PROCEDURE — 85025 COMPLETE CBC W/AUTO DIFF WBC: CPT

## 2023-11-17 ENCOUNTER — HOSPITAL ENCOUNTER (OUTPATIENT)
Age: 68
End: 2023-11-17
Payer: MEDICARE

## 2023-11-17 DIAGNOSIS — J82.83: ICD-10-CM

## 2023-11-17 DIAGNOSIS — J45.50: Primary | ICD-10-CM

## 2023-11-17 PROCEDURE — 94060 EVALUATION OF WHEEZING: CPT

## 2023-12-19 ENCOUNTER — HOSPITAL ENCOUNTER (OUTPATIENT)
Dept: HOSPITAL 22 - ACC | Age: 68
End: 2023-12-19
Payer: MEDICARE

## 2023-12-19 DIAGNOSIS — Z79.01: Primary | ICD-10-CM

## 2023-12-19 DIAGNOSIS — Z51.81: ICD-10-CM

## 2023-12-19 LAB — PHA INR FINGERSTICK: 2.6 (ref 0.9–1.1)

## 2023-12-19 PROCEDURE — 85610 PROTHROMBIN TIME: CPT

## 2023-12-19 PROCEDURE — 99211 OFF/OP EST MAY X REQ PHY/QHP: CPT

## 2023-12-19 PROCEDURE — G0463 HOSPITAL OUTPT CLINIC VISIT: HCPCS

## 2023-12-29 ENCOUNTER — OFFICE VISIT (OUTPATIENT)
Dept: PULMONOLOGY | Facility: CLINIC | Age: 68
End: 2023-12-29
Payer: MEDICARE

## 2023-12-29 VITALS
SYSTOLIC BLOOD PRESSURE: 118 MMHG | DIASTOLIC BLOOD PRESSURE: 68 MMHG | HEART RATE: 83 BPM | OXYGEN SATURATION: 95 % | BODY MASS INDEX: 36.54 KG/M2 | TEMPERATURE: 98 F | WEIGHT: 223 LBS

## 2023-12-29 DIAGNOSIS — Z79.01 CHRONIC ANTICOAGULATION: ICD-10-CM

## 2023-12-29 DIAGNOSIS — J45.909 IDIOPATHIC ASTHMA: ICD-10-CM

## 2023-12-29 DIAGNOSIS — I82.403 RECURRENT ACUTE DEEP VEIN THROMBOSIS (DVT) OF BOTH LOWER EXTREMITIES: ICD-10-CM

## 2023-12-29 DIAGNOSIS — J45.909 ASTHMA, UNSPECIFIED ASTHMA SEVERITY, UNSPECIFIED WHETHER COMPLICATED, UNSPECIFIED WHETHER PERSISTENT: Primary | ICD-10-CM

## 2023-12-29 DIAGNOSIS — Z78.9 NON-SMOKER: ICD-10-CM

## 2023-12-29 LAB
BASOPHILS # BLD AUTO: 0.04 10*3/MM3 (ref 0–0.2)
BASOPHILS NFR BLD AUTO: 0.8 % (ref 0–1.5)
DEPRECATED RDW RBC AUTO: 37.1 FL (ref 37–54)
EOSINOPHIL # BLD AUTO: 0.26 10*3/MM3 (ref 0–0.4)
EOSINOPHIL NFR BLD AUTO: 5.2 % (ref 0.3–6.2)
ERYTHROCYTE [DISTWIDTH] IN BLOOD BY AUTOMATED COUNT: 12.5 % (ref 12.3–15.4)
HCT VFR BLD AUTO: 36.4 % (ref 34–46.6)
HGB BLD-MCNC: 11.2 G/DL (ref 12–15.9)
IMM GRANULOCYTES # BLD AUTO: 0.01 10*3/MM3 (ref 0–0.05)
IMM GRANULOCYTES NFR BLD AUTO: 0.2 % (ref 0–0.5)
LYMPHOCYTES # BLD AUTO: 1.16 10*3/MM3 (ref 0.7–3.1)
LYMPHOCYTES NFR BLD AUTO: 23.2 % (ref 19.6–45.3)
MCH RBC QN AUTO: 25.3 PG (ref 26.6–33)
MCHC RBC AUTO-ENTMCNC: 30.8 G/DL (ref 31.5–35.7)
MCV RBC AUTO: 82.2 FL (ref 79–97)
MONOCYTES # BLD AUTO: 0.44 10*3/MM3 (ref 0.1–0.9)
MONOCYTES NFR BLD AUTO: 8.8 % (ref 5–12)
NEUTROPHILS NFR BLD AUTO: 3.08 10*3/MM3 (ref 1.7–7)
NEUTROPHILS NFR BLD AUTO: 61.8 % (ref 42.7–76)
NRBC BLD AUTO-RTO: 0 /100 WBC (ref 0–0.2)
PLATELET # BLD AUTO: 254 10*3/MM3 (ref 140–450)
PMV BLD AUTO: 11.7 FL (ref 6–12)
RBC # BLD AUTO: 4.43 10*6/MM3 (ref 3.77–5.28)
WBC NRBC COR # BLD AUTO: 4.99 10*3/MM3 (ref 3.4–10.8)

## 2023-12-29 PROCEDURE — 86003 ALLG SPEC IGE CRUDE XTRC EA: CPT | Performed by: INTERNAL MEDICINE

## 2023-12-29 PROCEDURE — 83516 IMMUNOASSAY NONANTIBODY: CPT | Performed by: INTERNAL MEDICINE

## 2023-12-29 PROCEDURE — 86037 ANCA TITER EACH ANTIBODY: CPT | Performed by: INTERNAL MEDICINE

## 2023-12-29 PROCEDURE — 82103 ALPHA-1-ANTITRYPSIN TOTAL: CPT | Performed by: INTERNAL MEDICINE

## 2023-12-29 PROCEDURE — 85025 COMPLETE CBC W/AUTO DIFF WBC: CPT | Performed by: INTERNAL MEDICINE

## 2023-12-29 PROCEDURE — 82104 ALPHA-1-ANTITRYPSIN PHENO: CPT | Performed by: INTERNAL MEDICINE

## 2023-12-29 PROCEDURE — 82785 ASSAY OF IGE: CPT | Performed by: INTERNAL MEDICINE

## 2023-12-29 RX ORDER — LEVOCETIRIZINE DIHYDROCHLORIDE 5 MG/1
TABLET ORAL EVERY 24 HOURS
COMMUNITY

## 2023-12-29 RX ORDER — MECLIZINE HYDROCHLORIDE 25 MG/1
TABLET ORAL
COMMUNITY

## 2023-12-29 RX ORDER — INSULIN LISPRO 100 [IU]/ML
INJECTION, SOLUTION INTRAVENOUS; SUBCUTANEOUS
COMMUNITY

## 2023-12-29 RX ORDER — BUDESONIDE, GLYCOPYRROLATE, AND FORMOTEROL FUMARATE 160; 9; 4.8 UG/1; UG/1; UG/1
AEROSOL, METERED RESPIRATORY (INHALATION) EVERY 12 HOURS SCHEDULED
COMMUNITY

## 2023-12-29 NOTE — PROGRESS NOTES
"  PULMONARY  NOTE    Chief Complaint     Dyspnea on exertion, history of asthma, non-smoker, diastolic dysfunction, recurrent DVT, chronic anticoagulation with Coumadin    History of Present Illness     68-year-old female referred for chronic obstructive pulmonary disease    She is a non-smoker    She indicates that she has had asthma \"her whole life\"  Her symptoms have been worse over the last 3 years, however    She saw a pulmonary physician in [place] who diagnosed her with COPD  She was treated with Trelegy but had trouble with urinary retention so is now on Breztri with albuterol  She feels that the Breztri helps but she continues to have dyspnea on exertion on a daily basis    She is never had hemoptysis    She has a history of diastolic dysfunction    Also history of recurrent lower extremity DVT for which she is chronically anticoagulated on Coumadin    Patient Active Problem List   Diagnosis    Type 2 diabetes mellitus treated with insulin    Essential hypertension    Recurrent acute deep vein thrombosis (DVT) of both lower extremities    GERD with esophagitis    Vitamin D deficiency    Vitamin B12 deficiency    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    Primary insomnia    Paroxysmal tachycardia    Chronic midline low back pain without sciatica    Cervical spine pain    History of cervical fracture    Chronic persistent asthma    Non-smoker    Chronic anticoagulation (Coumadin)      Allergies   Allergen Reactions    Cephalexin Itching    Fluticasone-Umeclidin-Vilant Unknown - Low Severity    Insulin Glargine Unknown - Low Severity    Valacyclovir Unknown - Low Severity    Benadryl Allergy [Diphenhydramine] Other (See Comments)     Hypertension; only intolerant to IV form     Lisinopril Cough    Penicillins Itching    Statins Rash and Myalgia       Current Outpatient Medications:     albuterol sulfate HFA (PROAIR HFA) 108 (90 Base) MCG/ACT inhaler, Every six hours, Disp: , Rfl:     " Bioflavonoid Products (VITAMIN C PLUS) 1000 MG tablet, Take  by mouth., Disp: , Rfl:     Breztri Aerosphere 160-9-4.8 MCG/ACT aerosol inhaler, Every 12 (Twelve) Hours., Disp: , Rfl:     citalopram (CeleXA) 20 MG tablet, Take 1 tablet by mouth Daily., Disp: 90 tablet, Rfl: 0    Denosumab (PROLIA SC), Inject  under the skin into the appropriate area as directed., Disp: , Rfl:     diazePAM (VALIUM) 5 MG tablet, Take 1 tablet by mouth At Night As Needed for Anxiety (insomnia)., Disp: , Rfl:     flunisolide (NASALIDE) 25 MCG/ACT (0.025%) solution nasal spray, flunisolide, Disp: , Rfl:     glucose blood (ONE TOUCH ULTRA TEST) test strip, USE AS DIRECTED TO TEST THREE TIMES DAILY, Disp: 100 each, Rfl: 2    glucose blood test strip, OneTouch Ultra Test strips, Disp: , Rfl:     Insulin Lispro Prot & Lispro (HUMALOG MIX 75/25 KWIKPEN) (75-25) 100 UNIT/ML suspension pen-injector pen, Inject 20 Units under the skin into the appropriate area as directed 2 (Two) Times a Day With Meals., Disp: 10 pen, Rfl: 1    lansoprazole (PREVACID) 30 MG capsule, Take 1 capsule by mouth 2 (Two) Times a Day., Disp: 180 capsule, Rfl: 3    Lantus SoloStar 100 UNIT/ML injection pen, INJECT 40 UNITS UNDER THE SKIN INTO THE APPROPRIATE AREA EVERY NIGHT AS DIRECTED, Disp: 45 mL, Rfl: 2    losartan (COZAAR) 50 MG tablet, TK 1 T PO QD FOR 10 DAYS, Disp: , Rfl: 0    meclizine (ANTIVERT) 25 MG tablet, 1/2 or whole tablet orally at night as needed for vertigo for 90 days, Disp: , Rfl:     metoprolol succinate XL (TOPROL XL) 25 MG 24 hr tablet, , Disp: , Rfl:     montelukast (SINGULAIR) 10 MG tablet, , Disp: , Rfl:     Multiple Vitamins-Minerals (MULTIVITAMIN ADULT PO), Take  by mouth., Disp: , Rfl:     warfarin (COUMADIN) 4 MG tablet, 8 mg daily on Monday/Wednesday/Friday (Patient taking differently: 8 mg daily every other day 10mg Mon and Fri), Disp: 16 tablet, Rfl: 3    Xyzal Allergy 24HR 5 MG tablet, Take  by mouth Daily., Disp: , Rfl:      Fluticasone-Umeclidin-Vilant (TRELEGY ELLIPTA IN), Inhale. (Patient not taking: Reported on 12/29/2023), Disp: , Rfl:     Insulin Lispro, 1 Unit Dial, (HumaLOG KwikPen) 100 UNIT/ML solution pen-injector, 14 units twice a day with meals, Disp: , Rfl:   MEDICATION LIST AND ALLERGIES REVIEWED.    Family History   Problem Relation Age of Onset    Diabetes Mother     COPD Mother     Lung cancer Mother     Diabetes Father     Heart disease Father     Cancer Father     Diabetes Sister     Diabetes Brother     Blindness Brother         due to diabetes     Diabetes Sister      Social History     Tobacco Use    Smoking status: Never    Smokeless tobacco: Never   Vaping Use    Vaping Use: Never used   Substance Use Topics    Alcohol use: No    Drug use: No     Social History     Social History Narrative        Lifelong non-smoker    Does not drink alcohol     FAMILY AND SOCIAL HISTORY REVIEWED.    Review of Systems  IF PRESENT REFER TO SCANNED ROS SHEET FROM SAME DATE  OTHERWISE ROS OBTAINED AND NON-CONTRIBUTORY OVER HPI.    /68   Pulse 83   Temp 98 °F (36.7 °C) (Infrared)   Wt 101 kg (223 lb)   LMP  (LMP Unknown)   SpO2 95% Comment: room air at rest  BMI 36.54 kg/m²   Physical Exam  Vitals and nursing note reviewed.   Constitutional:       General: She is not in acute distress.     Appearance: She is well-developed. She is not diaphoretic.   HENT:      Head: Normocephalic and atraumatic.   Neck:      Thyroid: No thyromegaly.   Cardiovascular:      Rate and Rhythm: Normal rate and regular rhythm.      Heart sounds: Normal heart sounds. No murmur heard.  Pulmonary:      Effort: Pulmonary effort is normal.      Breath sounds: Normal breath sounds. No stridor.   Lymphadenopathy:      Cervical: No cervical adenopathy.      Upper Body:      Right upper body: No supraclavicular or epitrochlear adenopathy.      Left upper body: No supraclavicular or epitrochlear adenopathy.   Skin:     General: Skin is warm and  dry.   Neurological:      Mental Status: She is alert and oriented to person, place, and time.   Psychiatric:         Behavior: Behavior normal.         Results     Chest x-ray reveals no effusions, infiltrates, or consolidation    PFTs reveal moderate airway obstruction without change in FEV1 following use of inhaled bronchodilators  No restriction and a reduced diffusion capacity that corrects to normal when adjusted for a velar volume    Immunization History   Administered Date(s) Administered    31-influenza Vac Quardvalent Preservativ 10/13/2017, 10/29/2018    Fluzone (or Fluarix & Flulaval for VFC) >6mos 10/08/2019    Fluzone High Dose =>65 Years ([employer] ONLY) 10/27/2020, 09/23/2021, 09/29/2022, 11/02/2023    Hepatitis A 12/10/2018    Pneumococcal Conjugate 13-Valent (PCV13) 09/17/2020    Pneumococcal Polysaccharide (PPSV23) 09/23/2021    Tdap 01/10/2020    Zoster, Unspecified 06/16/2010     Problem List       ICD-10-CM ICD-9-CM   1. Asthma, unspecified asthma severity, unspecified whether complicated, unspecified whether persistent  J45.909 493.90   2. Chronic persistent asthma  J45.909 493.90   3. Non-smoker  Z78.9 V49.89   4. Recurrent acute deep vein thrombosis (DVT) of both lower extremities  I82.403 453.40   5. Chronic anticoagulation (Coumadin)  Z79.01 V58.61       Discussion     She is non-smoker  I think her clinical picture is most consistent with chronic persistent asthma  Her symptoms are not well-controlled on triple inhaled drug therapy and she continues to exhibit airway obstruction    I have recommended an alpha-1 antitrypsin screen  Also, will get labs such as a RAST panel, IgE level, eosinophil count, and ANCA  Based on those we will consider a biologic agent    In the meantime, I recommend she remain on Breztri with albuterol as needed    Moderate level of Medical Decision Making complexity based on 1 undiagnosed new problem or 2 stable chronic conditions, independent interpretation of  tests, and/or prescription drug management     Paul Andrew MD  Note electronically signed    CC: Vijaya Wharton, APRN

## 2024-01-01 LAB — IGE SERPL-ACNC: 30 IU/ML (ref 6–495)

## 2024-01-02 LAB
A ALTERNATA IGE QN: <0.1 KU/L
A FUMIGATUS IGE QN: <0.1 KU/L
AMER ROACH IGE QN: <0.1 KU/L
BAHIA GRASS IGE QN: <0.1 KU/L
BERMUDA GRASS IGE QN: <0.1 KU/L
BOXELDER IGE QN: <0.1 KU/L
C HERBARUM IGE QN: <0.1 KU/L
C-ANCA TITR SER IF: NORMAL TITER
CAT DANDER IGE QN: <0.1 KU/L
CMN PIGWEED IGE QN: <0.1 KU/L
COMMON RAGWEED IGE QN: <0.1 KU/L
CONV CLASS DESCRIPTION: NORMAL
D FARINAE IGE QN: <0.1 KU/L
D PTERONYSS IGE QN: <0.1 KU/L
DOG DANDER IGE QN: <0.1 KU/L
ENGL PLANTAIN IGE QN: <0.1 KU/L
HAZELNUT POLN IGE QN: <0.1 KU/L
JOHNSON GRASS IGE QN: <0.1 KU/L
KENT BLUE GRASS IGE QN: <0.1 KU/L
LONDON PLANE IGE QN: <0.1 KU/L
M RACEMOSUS IGE QN: <0.1 KU/L
MT JUNIPER IGE QN: <0.1 KU/L
MUGWORT IGE QN: <0.1 KU/L
MYELOPEROXIDASE AB SER IA-ACNC: <0.2 UNITS (ref 0–0.9)
NETTLE IGE QN: <0.1 KU/L
P NOTATUM IGE QN: <0.1 KU/L
P-ANCA ATYPICAL TITR SER IF: NORMAL TITER
P-ANCA TITR SER IF: NORMAL TITER
PROTEINASE3 AB SER IA-ACNC: <0.2 UNITS (ref 0–0.9)
S BOTRYOSUM IGE QN: <0.1 KU/L
SHEEP SORREL IGE QN: <0.1 KU/L
SWEET GUM IGE QN: <0.1 KU/L
WHITE ELM IGE QN: <0.1 KU/L
WHITE HICKORY IGE QN: <0.1 KU/L
WHITE MULBERRY IGE QN: <0.1 KU/L
WHITE OAK IGE QN: <0.1 KU/L

## 2024-01-04 LAB
A1AT PHENOTYP SERPL IFE: NORMAL
A1AT SERPL-MCNC: 152 MG/DL (ref 101–187)

## 2024-01-15 ENCOUNTER — DOCUMENTATION (OUTPATIENT)
Dept: PULMONOLOGY | Facility: CLINIC | Age: 69
End: 2024-01-15
Payer: MEDICARE

## 2024-01-15 NOTE — PROGRESS NOTES
Corrected chest x-ray report for 12/29/2023    Indication: Shortness of breath    PA and lateral chest x-rays obtained  Cardiac silhouette was normal in size  CT ratio is 14 x 30  Anterior eventration of the right hemidiaphragm  Calcified nodules in the pulmonary parenchyma and nhi consistent with old granulomatous disease  No effusions, infiltrates, or consolidation  No old films available for comparison

## 2024-01-30 ENCOUNTER — HOSPITAL ENCOUNTER (OUTPATIENT)
Dept: HOSPITAL 22 - ACC | Age: 69
End: 2024-01-30
Payer: MEDICARE

## 2024-01-30 DIAGNOSIS — Z51.81: ICD-10-CM

## 2024-01-30 DIAGNOSIS — Z79.01: Primary | ICD-10-CM

## 2024-01-30 LAB — PHA INR FINGERSTICK: 2.5 (ref 0.9–1.1)

## 2024-01-30 PROCEDURE — G0463 HOSPITAL OUTPT CLINIC VISIT: HCPCS

## 2024-01-30 PROCEDURE — 85610 PROTHROMBIN TIME: CPT

## 2024-01-30 PROCEDURE — 99211 OFF/OP EST MAY X REQ PHY/QHP: CPT

## 2024-02-29 ENCOUNTER — TELEPHONE (OUTPATIENT)
Dept: PULMONOLOGY | Facility: CLINIC | Age: 69
End: 2024-02-29

## 2024-02-29 ENCOUNTER — OFFICE VISIT (OUTPATIENT)
Dept: PULMONOLOGY | Facility: CLINIC | Age: 69
End: 2024-02-29
Payer: MEDICARE

## 2024-02-29 VITALS
OXYGEN SATURATION: 95 % | TEMPERATURE: 97.5 F | WEIGHT: 224.3 LBS | SYSTOLIC BLOOD PRESSURE: 122 MMHG | RESPIRATION RATE: 18 BRPM | DIASTOLIC BLOOD PRESSURE: 64 MMHG | HEIGHT: 66 IN | BODY MASS INDEX: 36.05 KG/M2 | HEART RATE: 78 BPM

## 2024-02-29 DIAGNOSIS — Z79.01 CHRONIC ANTICOAGULATION: ICD-10-CM

## 2024-02-29 DIAGNOSIS — I82.403 RECURRENT ACUTE DEEP VEIN THROMBOSIS (DVT) OF BOTH LOWER EXTREMITIES: ICD-10-CM

## 2024-02-29 DIAGNOSIS — J45.50 SEVERE PERSISTENT ASTHMA, UNSPECIFIED WHETHER COMPLICATED: Primary | ICD-10-CM

## 2024-02-29 DIAGNOSIS — Z78.9 NON-SMOKER: ICD-10-CM

## 2024-02-29 DIAGNOSIS — J45.909 IDIOPATHIC ASTHMA: Primary | ICD-10-CM

## 2024-02-29 NOTE — TELEPHONE ENCOUNTER
Spoke with patient regarding [facility] approval. Prescription sent to [facility]. Tezspire [facility] phone number [phone] given for potential financial  assistance. Patient instructed to call office once medication has been received to schedule first injection and teaching. Patient verbalized understanding.

## 2024-02-29 NOTE — PROGRESS NOTES
PULMONARY  NOTE    Chief Complaint     Chronic persistent asthma, non-smoker, diastolic dysfunction, recurrent DVT, chronic anticoagulation with Coumadin    History of Present Illness     68-year-old female returns today for follow-up  I initially saw her 12/29/2023    She is a lifelong non-smoker    She has a history of asthma  Her symptoms are poorly controlled despite being on Breztri  She had airway obstruction on prior spirometry    She has a history of diastolic dysfunction but has not noted lower extremity edema    She has a history of recurrent lower extremity DVT and is chronically ANTICOAGULATED on Coumadin    Patient Active Problem List   Diagnosis    Type 2 diabetes mellitus treated with insulin    Essential hypertension    Recurrent acute deep vein thrombosis (DVT) of both lower extremities    GERD with esophagitis    Vitamin D deficiency    Vitamin B12 deficiency    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    Primary insomnia    Paroxysmal tachycardia    Chronic midline low back pain without sciatica    Cervical spine pain    History of cervical fracture    Chronic persistent asthma    Non-smoker    Chronic anticoagulation (Coumadin)      Allergies   Allergen Reactions    Cephalexin Itching    Fluticasone-Umeclidin-Vilant Unknown - Low Severity    Insulin Glargine Unknown - Low Severity    Valacyclovir Unknown - Low Severity    Benadryl Allergy [Diphenhydramine] Other (See Comments)     Hypertension; only intolerant to IV form     Lisinopril Cough    Penicillins Itching    Statins Rash and Myalgia       Current Outpatient Medications:     albuterol sulfate HFA (PROAIR HFA) 108 (90 Base) MCG/ACT inhaler, Every six hours, Disp: , Rfl:     Bioflavonoid Products (VITAMIN C PLUS) 1000 MG tablet, Take  by mouth., Disp: , Rfl:     Breztri Aerosphere 160-9-4.8 MCG/ACT aerosol inhaler, Every 12 (Twelve) Hours., Disp: , Rfl:     citalopram (CeleXA) 20 MG tablet, Take 1 tablet by mouth Daily.,  Disp: 90 tablet, Rfl: 0    Denosumab (PROLIA SC), Inject  under the skin into the appropriate area as directed., Disp: , Rfl:     diazePAM (VALIUM) 5 MG tablet, Take 1 tablet by mouth At Night As Needed for Anxiety (insomnia)., Disp: , Rfl:     flunisolide (NASALIDE) 25 MCG/ACT (0.025%) solution nasal spray, flunisolide, Disp: , Rfl:     Fluticasone-Umeclidin-Vilant (TRELEGY ELLIPTA IN), Inhale., Disp: , Rfl:     glucose blood (ONE TOUCH ULTRA TEST) test strip, USE AS DIRECTED TO TEST THREE TIMES DAILY, Disp: 100 each, Rfl: 2    glucose blood test strip, OneTouch Ultra Test strips, Disp: , Rfl:     Insulin Lispro Prot & Lispro (HUMALOG MIX 75/25 KWIKPEN) (75-25) 100 UNIT/ML suspension pen-injector pen, Inject 20 Units under the skin into the appropriate area as directed 2 (Two) Times a Day With Meals., Disp: 10 pen, Rfl: 1    Insulin Lispro, 1 Unit Dial, (HumaLOG KwikPen) 100 UNIT/ML solution pen-injector, 14 units twice a day with meals, Disp: , Rfl:     lansoprazole (PREVACID) 30 MG capsule, Take 1 capsule by mouth 2 (Two) Times a Day., Disp: 180 capsule, Rfl: 3    Lantus SoloStar 100 UNIT/ML injection pen, INJECT 40 UNITS UNDER THE SKIN INTO THE APPROPRIATE AREA EVERY NIGHT AS DIRECTED, Disp: 45 mL, Rfl: 2    losartan (COZAAR) 50 MG tablet, TK 1 T PO QD FOR 10 DAYS, Disp: , Rfl: 0    meclizine (ANTIVERT) 25 MG tablet, 1/2 or whole tablet orally at night as needed for vertigo for 90 days, Disp: , Rfl:     metoprolol succinate XL (TOPROL XL) 25 MG 24 hr tablet, , Disp: , Rfl:     montelukast (SINGULAIR) 10 MG tablet, , Disp: , Rfl:     Multiple Vitamins-Minerals (MULTIVITAMIN ADULT PO), Take  by mouth., Disp: , Rfl:     warfarin (COUMADIN) 4 MG tablet, 8 mg daily on Monday/Wednesday/Friday (Patient taking differently: 8 mg daily every other day 10mg Mon and Fri), Disp: 16 tablet, Rfl: 3    Xyzal Allergy 24HR 5 MG tablet, Take  by mouth Daily., Disp: , Rfl:   MEDICATION LIST AND ALLERGIES REVIEWED.    Family History  "  Problem Relation Age of Onset    Diabetes Mother     COPD Mother     Lung cancer Mother     Diabetes Father     Heart disease Father     Cancer Father     Diabetes Sister     Diabetes Brother     Blindness Brother         due to diabetes     Diabetes Sister      Social History     Tobacco Use    Smoking status: Never    Smokeless tobacco: Never   Vaping Use    Vaping Use: Never used   Substance Use Topics    Alcohol use: No    Drug use: No     Social History     Social History Narrative        Lifelong non-smoker    Does not drink alcohol     FAMILY AND SOCIAL HISTORY REVIEWED.    Review of Systems  IF PRESENT REFER TO SCANNED ROS SHEET FROM SAME DATE  OTHERWISE ROS OBTAINED AND NON-CONTRIBUTORY OVER HPI.    /64 (BP Location: Right arm, Patient Position: Sitting)   Pulse 78   Temp 97.5 °F (36.4 °C)   Resp 18   Ht 167.6 cm (66\")   Wt 102 kg (224 lb 4.8 oz)   LMP  (LMP Unknown)   SpO2 95% Comment: Room air  BMI 36.20 kg/m²   Physical Exam  Vitals and nursing note reviewed.   Constitutional:       General: She is not in acute distress.     Appearance: She is well-developed. She is not diaphoretic.   HENT:      Head: Normocephalic and atraumatic.   Neck:      Thyroid: No thyromegaly.   Cardiovascular:      Rate and Rhythm: Normal rate and regular rhythm.      Heart sounds: Normal heart sounds. No murmur heard.  Pulmonary:      Effort: Pulmonary effort is normal.      Breath sounds: No stridor.   Lymphadenopathy:      Cervical: No cervical adenopathy.      Upper Body:      Right upper body: No supraclavicular or epitrochlear adenopathy.      Left upper body: No supraclavicular or epitrochlear adenopathy.   Skin:     General: Skin is warm and dry.   Neurological:      Mental Status: She is alert and oriented to person, place, and time.   Psychiatric:         Behavior: Behavior normal.         Results     Lab work unremarkable    Immunization History   Administered Date(s) Administered    " 31-influenza Vac Quardvalent Preservativ 10/13/2017, 10/29/2018    Fluzone (or Fluarix & Flulaval for VFC) >6mos 10/08/2019    Fluzone High Dose =>65 Years ([facility] ONLY) 10/27/2020, 09/23/2021, 09/29/2022, 11/02/2023    Hepatitis A 12/10/2018    Pneumococcal Conjugate 13-Valent (PCV13) 09/17/2020    Pneumococcal Polysaccharide (PPSV23) 09/23/2021    Tdap 01/10/2020    Zoster, Unspecified 06/16/2010     Problem List       ICD-10-CM ICD-9-CM   1. Chronic persistent asthma  J45.909 493.90   2. Chronic anticoagulation (Coumadin)  Z79.01 V58.61   3. Non-smoker  Z78.9 V49.89   4. Recurrent acute deep vein thrombosis (DVT) of both lower extremities  I82.403 453.40       Discussion     She is doing about the same with no exacerbation of symptoms  Overall, however, her symptoms are not well-controlled  She has daily shortness of breath and wheezing  This is despite triple drug inhaler therapy    She has a history of diastolic dysfunction but no evidence of decompensated heart failure    She remains chronically anticoagulated on Coumadin    I think she would be a good candidate for a biologic agent    Moderate level of Medical Decision Making complexity based on 2 or more chronic stable illnesses and an independent review of test results and/or prescription drug management.    Paul Andrew MD  Note electronically signed    CC: Vijaya Wharton APRN

## 2024-03-13 ENCOUNTER — HOSPITAL ENCOUNTER (OUTPATIENT)
Dept: HOSPITAL 22 - ACC | Age: 69
End: 2024-03-13
Payer: MEDICARE

## 2024-03-13 DIAGNOSIS — Z79.01: Primary | ICD-10-CM

## 2024-03-13 DIAGNOSIS — Z51.81: ICD-10-CM

## 2024-03-13 LAB — PHA INR FINGERSTICK: 2.2 (ref 0.9–1.1)

## 2024-03-13 PROCEDURE — 99211 OFF/OP EST MAY X REQ PHY/QHP: CPT

## 2024-03-13 PROCEDURE — 85610 PROTHROMBIN TIME: CPT

## 2024-03-13 PROCEDURE — G0463 HOSPITAL OUTPT CLINIC VISIT: HCPCS

## 2024-03-28 ENCOUNTER — HOSPITAL ENCOUNTER (OUTPATIENT)
Dept: HOSPITAL 22 - RT | Age: 69
End: 2024-03-28
Payer: MEDICARE

## 2024-03-28 DIAGNOSIS — R06.09: Primary | ICD-10-CM

## 2024-03-28 DIAGNOSIS — M25.551: ICD-10-CM

## 2024-03-28 PROCEDURE — 93306 TTE W/DOPPLER COMPLETE: CPT

## 2024-03-28 PROCEDURE — 73502 X-RAY EXAM HIP UNI 2-3 VIEWS: CPT

## 2024-04-08 ENCOUNTER — CLINICAL SUPPORT (OUTPATIENT)
Dept: PULMONOLOGY | Facility: CLINIC | Age: 69
End: 2024-04-08
Payer: MEDICARE

## 2024-04-08 DIAGNOSIS — J45.50 SEVERE PERSISTENT ASTHMA, UNSPECIFIED WHETHER COMPLICATED: Primary | ICD-10-CM

## 2024-04-08 RX ORDER — EPINEPHRINE 0.3 MG/.3ML
0.3 INJECTION SUBCUTANEOUS ONCE
Qty: 1 EACH | Refills: 0 | Status: SHIPPED | OUTPATIENT
Start: 2024-04-08 | End: 2024-04-08

## 2024-04-08 NOTE — PROGRESS NOTES
Tezspire 210mg SQ right lower abdomen, patient self administered. Tolerated well. Patient supplied.  Patient education completed and verbalized understanding.

## 2024-04-24 ENCOUNTER — HOSPITAL ENCOUNTER (OUTPATIENT)
Dept: HOSPITAL 22 - ACC | Age: 69
End: 2024-04-24
Payer: MEDICARE

## 2024-04-24 DIAGNOSIS — Z79.01: Primary | ICD-10-CM

## 2024-04-24 DIAGNOSIS — Z51.81: ICD-10-CM

## 2024-04-24 LAB — PHA INR FINGERSTICK: 2.7 (ref 0.9–1.1)

## 2024-04-24 PROCEDURE — G0463 HOSPITAL OUTPT CLINIC VISIT: HCPCS

## 2024-04-24 PROCEDURE — 85610 PROTHROMBIN TIME: CPT

## 2024-04-24 PROCEDURE — 99211 OFF/OP EST MAY X REQ PHY/QHP: CPT

## 2024-05-16 ENCOUNTER — OFFICE VISIT (OUTPATIENT)
Dept: PULMONOLOGY | Facility: CLINIC | Age: 69
End: 2024-05-16
Payer: MEDICARE

## 2024-05-16 VITALS
HEART RATE: 80 BPM | DIASTOLIC BLOOD PRESSURE: 78 MMHG | WEIGHT: 222 LBS | TEMPERATURE: 98 F | HEIGHT: 66 IN | SYSTOLIC BLOOD PRESSURE: 120 MMHG | OXYGEN SATURATION: 93 % | BODY MASS INDEX: 35.68 KG/M2

## 2024-05-16 DIAGNOSIS — I82.403 RECURRENT ACUTE DEEP VEIN THROMBOSIS (DVT) OF BOTH LOWER EXTREMITIES: ICD-10-CM

## 2024-05-16 DIAGNOSIS — Z79.01 CHRONIC ANTICOAGULATION: ICD-10-CM

## 2024-05-16 DIAGNOSIS — J45.909 IDIOPATHIC ASTHMA: Primary | ICD-10-CM

## 2024-05-16 DIAGNOSIS — Z78.9 NON-SMOKER: ICD-10-CM

## 2024-05-16 RX ORDER — FUROSEMIDE 20 MG/1
20 TABLET ORAL
COMMUNITY
Start: 2024-04-03

## 2024-05-16 NOTE — PROGRESS NOTES
PULMONARY  NOTE    Chief Complaint     Chronic persistent asthma, non-smoker, diastolic dysfunction, recurrent DVT, chronic anticoagulation with Coumadin    History of Present Illness     68-year-old female returns today for follow-up  I last saw her 2/29/2024    She is a lifelong non-smoker    She has asthma with moderate obstructive airways disease and most recent spirometry  Symptoms have not been as well-controlled on Breztri  Therefore, we started Tezspire and she is gotten 2 doses  So far she is not sure if there has been any difference    She has a history of diastolic dysfunction but no lower extremity edema    She has a history of recurrent DVT and is chronically anticoagulated on Coumadin    Patient Active Problem List   Diagnosis    Type 2 diabetes mellitus treated with insulin    Essential hypertension    Recurrent acute deep vein thrombosis (DVT) of both lower extremities    GERD with esophagitis    Vitamin D deficiency    Vitamin B12 deficiency    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    Primary insomnia    Paroxysmal tachycardia    Chronic midline low back pain without sciatica    Cervical spine pain    History of cervical fracture    Chronic persistent asthma    Non-smoker    Chronic anticoagulation (Coumadin)      Allergies   Allergen Reactions    Cephalexin Itching    Fluticasone-Umeclidin-Vilant Unknown - Low Severity    Insulin Glargine Unknown - Low Severity    Valacyclovir Unknown - Low Severity    Benadryl Allergy [Diphenhydramine] Other (See Comments)     Hypertension; only intolerant to IV form     Lisinopril Cough    Penicillins Itching    Statins Rash and Myalgia       Current Outpatient Medications:     albuterol sulfate HFA (PROAIR HFA) 108 (90 Base) MCG/ACT inhaler, Every six hours, Disp: , Rfl:     Bioflavonoid Products (VITAMIN C PLUS) 1000 MG tablet, Take  by mouth., Disp: , Rfl:     Breztri Aerosphere 160-9-4.8 MCG/ACT aerosol inhaler, Every 12 (Twelve) Hours.,  Disp: , Rfl:     citalopram (CeleXA) 20 MG tablet, Take 1 tablet by mouth Daily., Disp: 90 tablet, Rfl: 0    Denosumab (PROLIA SC), Inject  under the skin into the appropriate area as directed., Disp: , Rfl:     diazePAM (VALIUM) 5 MG tablet, Take 1 tablet by mouth At Night As Needed for Anxiety (insomnia)., Disp: , Rfl:     flunisolide (NASALIDE) 25 MCG/ACT (0.025%) solution nasal spray, flunisolide, Disp: , Rfl:     Fluticasone-Umeclidin-Vilant (TRELEGY ELLIPTA IN), Inhale., Disp: , Rfl:     furosemide (LASIX) 20 MG tablet, 1 tablet., Disp: , Rfl:     glucose blood (ONE TOUCH ULTRA TEST) test strip, USE AS DIRECTED TO TEST THREE TIMES DAILY, Disp: 100 each, Rfl: 2    glucose blood test strip, OneTouch Ultra Test strips, Disp: , Rfl:     Insulin Lispro Prot & Lispro (HUMALOG MIX 75/25 KWIKPEN) (75-25) 100 UNIT/ML suspension pen-injector pen, Inject 20 Units under the skin into the appropriate area as directed 2 (Two) Times a Day With Meals., Disp: 10 pen, Rfl: 1    Insulin Lispro, 1 Unit Dial, (HumaLOG KwikPen) 100 UNIT/ML solution pen-injector, 14 units twice a day with meals, Disp: , Rfl:     lansoprazole (PREVACID) 30 MG capsule, Take 1 capsule by mouth 2 (Two) Times a Day., Disp: 180 capsule, Rfl: 3    Lantus SoloStar 100 UNIT/ML injection pen, INJECT 40 UNITS UNDER THE SKIN INTO THE APPROPRIATE AREA EVERY NIGHT AS DIRECTED, Disp: 45 mL, Rfl: 2    losartan (COZAAR) 50 MG tablet, TK 1 T PO QD FOR 10 DAYS, Disp: , Rfl: 0    meclizine (ANTIVERT) 25 MG tablet, 1/2 or whole tablet orally at night as needed for vertigo for 90 days, Disp: , Rfl:     metoprolol succinate XL (TOPROL XL) 25 MG 24 hr tablet, , Disp: , Rfl:     montelukast (SINGULAIR) 10 MG tablet, , Disp: , Rfl:     Multiple Vitamins-Minerals (MULTIVITAMIN ADULT PO), Take  by mouth., Disp: , Rfl:     Tezepelumab-ekko 210 MG/1.91ML solution auto-injector, Inject 1.91 mL under the skin into the appropriate area as directed Every 28 (Twenty-Eight) Days.,  "Disp: 1.91 mL, Rfl: 11    warfarin (COUMADIN) 4 MG tablet, 8 mg daily on Monday/Wednesday/Friday (Patient taking differently: 8 mg daily every other day 10mg Mon and Fri), Disp: 16 tablet, Rfl: 3    Xyzal Allergy 24HR 5 MG tablet, Take  by mouth Daily., Disp: , Rfl:   MEDICATION LIST AND ALLERGIES REVIEWED.    Family History   Problem Relation Age of Onset    Diabetes Mother     COPD Mother     Lung cancer Mother     Diabetes Father     Heart disease Father     Cancer Father     Diabetes Sister     Diabetes Brother     Blindness Brother         due to diabetes     Diabetes Sister      Social History     Tobacco Use    Smoking status: Never    Smokeless tobacco: Never   Vaping Use    Vaping status: Never Used   Substance Use Topics    Alcohol use: No    Drug use: No     Social History     Social History Narrative        Lifelong non-smoker    Does not drink alcohol     FAMILY AND SOCIAL HISTORY REVIEWED.    Review of Systems  IF PRESENT REFER TO SCANNED ROS SHEET FROM SAME DATE  OTHERWISE ROS OBTAINED AND NON-CONTRIBUTORY OVER HPI.    /78 (BP Location: Left arm, Patient Position: Sitting, Cuff Size: Adult)   Pulse 80   Temp 98 °F (36.7 °C)   Ht 167.6 cm (66\")   Wt 101 kg (222 lb)   LMP  (LMP Unknown)   SpO2 93% Comment: room air at rest  BMI 35.83 kg/m²   Physical Exam  Vitals and nursing note reviewed.   Constitutional:       General: She is not in acute distress.     Appearance: She is well-developed. She is not diaphoretic.   HENT:      Head: Normocephalic and atraumatic.   Neck:      Thyroid: No thyromegaly.   Cardiovascular:      Rate and Rhythm: Normal rate and regular rhythm.      Heart sounds: Normal heart sounds. No murmur heard.  Pulmonary:      Effort: Pulmonary effort is normal.      Breath sounds: No stridor.   Lymphadenopathy:      Cervical: No cervical adenopathy.      Upper Body:      Right upper body: No supraclavicular or epitrochlear adenopathy.      Left upper body: No " supraclavicular or epitrochlear adenopathy.   Skin:     General: Skin is warm and dry.   Neurological:      Mental Status: She is alert and oriented to person, place, and time.   Psychiatric:         Behavior: Behavior normal.         Results     Immunization History   Administered Date(s) Administered    31-influenza Vac Quardvalent Preservativ 10/13/2017, 10/29/2018    Fluzone (or Fluarix & Flulaval for VFC) >6mos 10/08/2019    Fluzone High Dose =>65 Years ([employer] ONLY) 10/27/2020, 09/23/2021, 09/29/2022, 11/02/2023    Hepatitis A 12/10/2018    Pneumococcal Conjugate 13-Valent (PCV13) 09/17/2020    Pneumococcal Polysaccharide (PPSV23) 09/23/2021    Tdap 01/10/2020    Zoster, Unspecified 06/16/2010     Problem List       ICD-10-CM ICD-9-CM   1. Chronic persistent asthma  J45.909 493.90   2. Non-smoker  Z78.9 V49.89   3. Recurrent acute deep vein thrombosis (DVT) of both lower extremities  I82.403 453.40   4. Chronic anticoagulation (Coumadin)  Z79.01 V58.61       Discussion     Stable from the standpoint of her asthma with no recent acute exacerbation  At this point, however, not sure if the Tezspire is going to have an impact  She will remain on Breztri with albuterol as needed    She remains anticoagulated on Coumadin    No evidence of decompensated heart failure    I will see her back in 3 to 4 months or earlier if there are any problems in the meantime    Moderate level of Medical Decision Making complexity based on 1 undiagnosed new problem or 2 stable chronic conditions, independent interpretation of tests, and/or prescription drug management     Paul Andrew MD  Note electronically signed    CC: Vijaya Wharton APRN

## 2024-06-05 ENCOUNTER — HOSPITAL ENCOUNTER (OUTPATIENT)
Dept: HOSPITAL 22 - ACC | Age: 69
End: 2024-06-05
Payer: MEDICARE

## 2024-06-05 DIAGNOSIS — Z79.01: Primary | ICD-10-CM

## 2024-06-05 DIAGNOSIS — I87.8: ICD-10-CM

## 2024-06-05 LAB — PHA INR FINGERSTICK: 2.6 (ref 0.9–1.1)

## 2024-06-05 PROCEDURE — G0463 HOSPITAL OUTPT CLINIC VISIT: HCPCS

## 2024-06-05 PROCEDURE — 99211 OFF/OP EST MAY X REQ PHY/QHP: CPT

## 2024-06-05 PROCEDURE — 85610 PROTHROMBIN TIME: CPT

## 2024-06-06 ENCOUNTER — HOSPITAL ENCOUNTER (OUTPATIENT)
Dept: HOSPITAL 22 - PT | Age: 69
Discharge: HOME | End: 2024-06-06
Payer: MEDICARE

## 2024-06-06 DIAGNOSIS — M16.0: Primary | ICD-10-CM

## 2024-06-06 PROCEDURE — 97530 THERAPEUTIC ACTIVITIES: CPT

## 2024-06-06 PROCEDURE — 97014 ELECTRIC STIMULATION THERAPY: CPT

## 2024-06-06 PROCEDURE — 97163 PT EVAL HIGH COMPLEX 45 MIN: CPT

## 2024-06-06 PROCEDURE — 97110 THERAPEUTIC EXERCISES: CPT

## 2024-06-06 PROCEDURE — 97010 HOT OR COLD PACKS THERAPY: CPT

## 2024-06-06 PROCEDURE — 97140 MANUAL THERAPY 1/> REGIONS: CPT

## 2024-06-06 PROCEDURE — G0283 ELEC STIM OTHER THAN WOUND: HCPCS

## 2024-06-06 PROCEDURE — 97164 PT RE-EVAL EST PLAN CARE: CPT

## 2024-07-17 ENCOUNTER — HOSPITAL ENCOUNTER (OUTPATIENT)
Dept: HOSPITAL 22 - ACC | Age: 69
End: 2024-07-17
Payer: MEDICARE

## 2024-07-17 DIAGNOSIS — Z86.718: ICD-10-CM

## 2024-07-17 DIAGNOSIS — Z51.81: ICD-10-CM

## 2024-07-17 DIAGNOSIS — Z79.01: Primary | ICD-10-CM

## 2024-07-17 LAB — PHA INR FINGERSTICK: 2.2 (ref 0.9–1.1)

## 2024-07-17 PROCEDURE — 99211 OFF/OP EST MAY X REQ PHY/QHP: CPT

## 2024-07-17 PROCEDURE — G0463 HOSPITAL OUTPT CLINIC VISIT: HCPCS

## 2024-07-17 PROCEDURE — 85610 PROTHROMBIN TIME: CPT

## 2024-08-15 ENCOUNTER — HOSPITAL ENCOUNTER (OUTPATIENT)
Dept: HOSPITAL 22 - ACC | Age: 69
End: 2024-08-15
Payer: MEDICARE

## 2024-08-15 DIAGNOSIS — I48.91: ICD-10-CM

## 2024-08-15 DIAGNOSIS — Z79.01: Primary | ICD-10-CM

## 2024-08-15 LAB — PHA INR FINGERSTICK: 2.3 (ref 0.9–1.1)

## 2024-08-15 PROCEDURE — 99211 OFF/OP EST MAY X REQ PHY/QHP: CPT

## 2024-08-15 PROCEDURE — 85610 PROTHROMBIN TIME: CPT

## 2024-08-15 PROCEDURE — G0463 HOSPITAL OUTPT CLINIC VISIT: HCPCS

## 2024-09-05 ENCOUNTER — HOSPITAL ENCOUNTER (OUTPATIENT)
Dept: HOSPITAL 22 - LAB | Age: 69
Discharge: HOME | End: 2024-09-05
Payer: MEDICARE

## 2024-09-05 DIAGNOSIS — I82.403: ICD-10-CM

## 2024-09-05 DIAGNOSIS — E55.9: ICD-10-CM

## 2024-09-05 DIAGNOSIS — E53.8: ICD-10-CM

## 2024-09-05 DIAGNOSIS — Z79.01: ICD-10-CM

## 2024-09-05 DIAGNOSIS — E78.2: ICD-10-CM

## 2024-09-05 DIAGNOSIS — N18.2: Primary | ICD-10-CM

## 2024-09-05 DIAGNOSIS — E11.22: ICD-10-CM

## 2024-09-05 LAB
25-OH VITAMIN D, TOTAL: 84.9 NG/ML (ref 30–100)
ALBUMIN LEVEL: 3.9 G/DL (ref 3.5–5)
ALBUMIN/GLOB SERPL: 1.1 {RATIO} (ref 1.1–1.8)
ALP ISO SERPL-ACNC: 54 U/L (ref 38–126)
ALT SERPLBLD-CCNC: 73 U/L (ref 12–78)
ANION GAP SERPL CALC-SCNC: 12.7 MEQ/L (ref 5–15)
AST SERPL QL: 104 U/L (ref 14–36)
BILIRUBIN,TOTAL: 0.5 MG/DL (ref 0.2–1.3)
BUN SERPL-MCNC: 11 MG/DL (ref 7–17)
CALCIUM SPEC-MCNC: 9.5 MG/DL (ref 8.4–10.2)
CHLORIDE SPEC-SCNC: 107 MMOL/L (ref 98–107)
CHOLEST SPEC-SCNC: 227 MG/DL (ref 140–200)
CO2 SERPL-SCNC: 25 MMOL/L (ref 22–30)
CREAT BLD-SCNC: 0.8 MG/DL (ref 0.52–1.04)
ESTIMATED GLOMERULAR FILT RATE: 71 ML/MIN (ref 60–?)
GFR (AFRICAN AMERICAN): 86 ML/MIN (ref 60–?)
GLOBULIN SER CALC-MCNC: 3.4 G/DL (ref 1.3–3.2)
GLUCOSE: 167 MG/DL (ref 74–100)
HBA1C MFR BLD: 7.5 % (ref 4–6)
HCT VFR BLD CALC: 37.8 % (ref 37–47)
HDLC SERPL-MCNC: 41 MG/DL (ref 40–60)
HGB BLD-MCNC: 11.6 G/DL (ref 12.2–16.2)
INR PPP: 2 (ref 0.9–1.1)
MCHC RBC-ENTMCNC: 30.8 G/DL (ref 31.8–35.4)
MCV RBC: 85.7 FL (ref 81–99)
MEAN CORPUSCULAR HEMOGLOBIN: 26.4 PG (ref 27–31.2)
PLATELET # BLD: 258 K/MM3 (ref 142–424)
POTASSIUM: 4.7 MMOL/L (ref 3.5–5.1)
PROT SERPL-MCNC: 7.3 G/DL (ref 6.3–8.2)
PT BLD: 20.9 SECONDS (ref 10.1–12.5)
RBC # BLD AUTO: 4.4 M/MM3 (ref 4.2–5.4)
SODIUM SPEC-SCNC: 140 MMOL/L (ref 136–145)
TRIGLYCERIDES: 157 MG/DL (ref 30–150)
VITAMIN B12: 583 PG/ML (ref 239–931)
WBC # BLD AUTO: 4.9 K/MM3 (ref 4.8–10.8)

## 2024-09-05 PROCEDURE — 82607 VITAMIN B-12: CPT

## 2024-09-05 PROCEDURE — 80053 COMPREHEN METABOLIC PANEL: CPT

## 2024-09-05 PROCEDURE — 85610 PROTHROMBIN TIME: CPT

## 2024-09-05 PROCEDURE — 80061 LIPID PANEL: CPT

## 2024-09-05 PROCEDURE — 83036 HEMOGLOBIN GLYCOSYLATED A1C: CPT

## 2024-09-05 PROCEDURE — 82570 ASSAY OF URINE CREATININE: CPT

## 2024-09-05 PROCEDURE — 36415 COLL VENOUS BLD VENIPUNCTURE: CPT

## 2024-09-05 PROCEDURE — 85025 COMPLETE CBC W/AUTO DIFF WBC: CPT

## 2024-09-05 PROCEDURE — 82043 UR ALBUMIN QUANTITATIVE: CPT

## 2024-09-05 PROCEDURE — 82306 VITAMIN D 25 HYDROXY: CPT

## 2024-09-06 ENCOUNTER — HOSPITAL ENCOUNTER (OUTPATIENT)
Dept: HOSPITAL 22 - INF | Age: 69
Discharge: HOME | End: 2024-09-06
Payer: MEDICARE

## 2024-09-06 VITALS
TEMPERATURE: 98.2 F | HEART RATE: 65 BPM | DIASTOLIC BLOOD PRESSURE: 68 MMHG | OXYGEN SATURATION: 98 % | RESPIRATION RATE: 18 BRPM | SYSTOLIC BLOOD PRESSURE: 157 MMHG

## 2024-09-06 VITALS — HEART RATE: 77 BPM | SYSTOLIC BLOOD PRESSURE: 145 MMHG | DIASTOLIC BLOOD PRESSURE: 61 MMHG

## 2024-09-06 DIAGNOSIS — I82.409: Primary | ICD-10-CM

## 2024-09-06 PROCEDURE — 96374 THER/PROPH/DIAG INJ IV PUSH: CPT

## 2024-09-06 RX ADMIN — ZOLEDRONIC ACID 400 MG: 5 INJECTION, SOLUTION INTRAVENOUS at 11:10

## 2024-09-06 RX ADMIN — SODIUM CHLORIDE 50 ML: 900 INJECTION, SOLUTION INTRAVENOUS at 11:31

## 2024-09-11 ENCOUNTER — HOSPITAL ENCOUNTER (OUTPATIENT)
Dept: HOSPITAL 22 - RAD | Age: 69
Discharge: HOME | End: 2024-09-11
Payer: MEDICARE

## 2024-09-11 DIAGNOSIS — Z12.31: Primary | ICD-10-CM

## 2024-09-11 DIAGNOSIS — M81.0: ICD-10-CM

## 2024-09-11 PROCEDURE — 77080 DXA BONE DENSITY AXIAL: CPT

## 2024-09-11 PROCEDURE — 77063 BREAST TOMOSYNTHESIS BI: CPT

## 2024-09-11 PROCEDURE — 77067 SCR MAMMO BI INCL CAD: CPT

## 2024-09-12 ENCOUNTER — HOSPITAL ENCOUNTER (OUTPATIENT)
Dept: HOSPITAL 22 - RAD | Age: 69
Discharge: HOME | End: 2024-09-12
Payer: MEDICARE

## 2024-09-12 DIAGNOSIS — R73.09: ICD-10-CM

## 2024-09-12 DIAGNOSIS — R53.83: ICD-10-CM

## 2024-09-12 DIAGNOSIS — L65.9: ICD-10-CM

## 2024-09-12 DIAGNOSIS — E55.9: Primary | ICD-10-CM

## 2024-09-12 DIAGNOSIS — R74.8: ICD-10-CM

## 2024-09-12 LAB
25-OH VITAMIN D, TOTAL: 81.5 NG/ML (ref 30–100)
FERRITIN SERPL-MCNC: 19.7 NG/ML (ref 11.1–264)
HBA1C MFR BLD: 8 % (ref 4–6)
TSH SERPL-ACNC: 3.49 UIU/ML (ref 0.47–4.68)

## 2024-09-12 PROCEDURE — 36415 COLL VENOUS BLD VENIPUNCTURE: CPT

## 2024-09-12 PROCEDURE — 84443 ASSAY THYROID STIM HORMONE: CPT

## 2024-09-12 PROCEDURE — 82306 VITAMIN D 25 HYDROXY: CPT

## 2024-09-12 PROCEDURE — 82728 ASSAY OF FERRITIN: CPT

## 2024-09-12 PROCEDURE — 76705 ECHO EXAM OF ABDOMEN: CPT

## 2024-09-12 PROCEDURE — 83036 HEMOGLOBIN GLYCOSYLATED A1C: CPT

## 2024-10-24 ENCOUNTER — OFFICE VISIT (OUTPATIENT)
Dept: PULMONOLOGY | Facility: CLINIC | Age: 69
End: 2024-10-24
Payer: MEDICARE

## 2024-10-24 VITALS
SYSTOLIC BLOOD PRESSURE: 134 MMHG | HEIGHT: 66 IN | BODY MASS INDEX: 35.52 KG/M2 | TEMPERATURE: 97 F | DIASTOLIC BLOOD PRESSURE: 82 MMHG | HEART RATE: 88 BPM | OXYGEN SATURATION: 95 % | WEIGHT: 221 LBS

## 2024-10-24 DIAGNOSIS — J45.909 IDIOPATHIC ASTHMA: ICD-10-CM

## 2024-10-24 DIAGNOSIS — Z79.01 CHRONIC ANTICOAGULATION: ICD-10-CM

## 2024-10-24 DIAGNOSIS — Z78.9 NON-SMOKER: Primary | ICD-10-CM

## 2024-10-24 RX ORDER — DULAGLUTIDE 0.75 MG/.5ML
INJECTION, SOLUTION SUBCUTANEOUS
COMMUNITY
Start: 2024-10-18

## 2024-10-24 NOTE — PROGRESS NOTES
PULMONARY  NOTE    Chief Complaint     Chronic persistent asthma, non-smoker, diastolic dysfunction, recurrent DVT, chronic anticoagulation on Coumadin    History of Present Illness     69-year-old female returns today for follow-up  I last saw her 5/16/2004    She is non-smoker    She has a history of asthma with moderate airway obstruction on prior spirometry  Symptoms have been much better controlled on Breztri with Tezspire  No major exacerbations and only rarely uses albuterol  Insurance is covering the cost of the medication so far    She has a history of diastolic dysfunction but has noted no lower extremity edema    She has a history of recurrent DVT and is chronically anticoagulated on Coumadin    Patient Active Problem List   Diagnosis    Type 2 diabetes mellitus treated with insulin    Essential hypertension    Recurrent acute deep vein thrombosis (DVT) of both lower extremities    GERD with esophagitis    Vitamin D deficiency    Vitamin B12 deficiency    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    Primary insomnia    Paroxysmal tachycardia    Chronic midline low back pain without sciatica    Cervical spine pain    History of cervical fracture    Chronic persistent asthma    Non-smoker    Chronic anticoagulation (Coumadin)      Allergies   Allergen Reactions    Cephalexin Itching    Fluticasone-Umeclidin-Vilant Unknown - Low Severity    Insulin Glargine Unknown - Low Severity    Valacyclovir Unknown - Low Severity    Benadryl Allergy [Diphenhydramine] Other (See Comments)     Hypertension; only intolerant to IV form     Lisinopril Cough    Penicillins Itching    Statins Rash and Myalgia       Current Outpatient Medications:     Trulicity 0.75 MG/0.5ML solution pen-injector, ADMINISTER 0.75 MG UNDER THE SKIN ONCE A WEEK ON SAME DAY EACH WEEK, Disp: , Rfl:     albuterol sulfate HFA (PROAIR HFA) 108 (90 Base) MCG/ACT inhaler, Every six hours, Disp: , Rfl:     Bioflavonoid Products (VITAMIN C  PLUS) 1000 MG tablet, Take  by mouth., Disp: , Rfl:     Breztri Aerosphere 160-9-4.8 MCG/ACT aerosol inhaler, Every 12 (Twelve) Hours., Disp: , Rfl:     citalopram (CeleXA) 20 MG tablet, Take 1 tablet by mouth Daily., Disp: 90 tablet, Rfl: 0    Denosumab (PROLIA SC), Inject  under the skin into the appropriate area as directed., Disp: , Rfl:     diazePAM (VALIUM) 5 MG tablet, Take 1 tablet by mouth At Night As Needed for Anxiety (insomnia)., Disp: , Rfl:     flunisolide (NASALIDE) 25 MCG/ACT (0.025%) solution nasal spray, flunisolide, Disp: , Rfl:     Fluticasone-Umeclidin-Vilant (TRELEGY ELLIPTA IN), Inhale., Disp: , Rfl:     furosemide (LASIX) 20 MG tablet, 1 tablet., Disp: , Rfl:     glucose blood (ONE TOUCH ULTRA TEST) test strip, USE AS DIRECTED TO TEST THREE TIMES DAILY, Disp: 100 each, Rfl: 2    glucose blood test strip, OneTouch Ultra Test strips, Disp: , Rfl:     Insulin Lispro Prot & Lispro (HUMALOG MIX 75/25 KWIKPEN) (75-25) 100 UNIT/ML suspension pen-injector pen, Inject 20 Units under the skin into the appropriate area as directed 2 (Two) Times a Day With Meals., Disp: 10 pen, Rfl: 1    Insulin Lispro, 1 Unit Dial, (HumaLOG KwikPen) 100 UNIT/ML solution pen-injector, 14 units twice a day with meals, Disp: , Rfl:     lansoprazole (PREVACID) 30 MG capsule, Take 1 capsule by mouth 2 (Two) Times a Day., Disp: 180 capsule, Rfl: 3    Lantus SoloStar 100 UNIT/ML injection pen, INJECT 40 UNITS UNDER THE SKIN INTO THE APPROPRIATE AREA EVERY NIGHT AS DIRECTED, Disp: 45 mL, Rfl: 2    losartan (COZAAR) 50 MG tablet, TK 1 T PO QD FOR 10 DAYS, Disp: , Rfl: 0    meclizine (ANTIVERT) 25 MG tablet, 1/2 or whole tablet orally at night as needed for vertigo for 90 days, Disp: , Rfl:     metoprolol succinate XL (TOPROL XL) 25 MG 24 hr tablet, , Disp: , Rfl:     montelukast (SINGULAIR) 10 MG tablet, , Disp: , Rfl:     Multiple Vitamins-Minerals (MULTIVITAMIN ADULT PO), Take  by mouth., Disp: , Rfl:     Tezepelumab-[facility] 210  "MG/1.91ML solution auto-injector, Inject 1.91 mL under the skin into the appropriate area as directed Every 28 (Twenty-Eight) Days., Disp: 1.91 mL, Rfl: 11    warfarin (COUMADIN) 4 MG tablet, 8 mg daily on Monday/Wednesday/Friday (Patient taking differently: 8 mg daily every other day 10mg Mon and Fri), Disp: 16 tablet, Rfl: 3    Xyzal Allergy 24HR 5 MG tablet, Take  by mouth Daily., Disp: , Rfl:   MEDICATION LIST AND ALLERGIES REVIEWED.    Family History   Problem Relation Age of Onset    Diabetes Mother     COPD Mother     Lung cancer Mother     Diabetes Father     Heart disease Father     Cancer Father     Diabetes Sister     Diabetes Brother     Blindness Brother         due to diabetes     Diabetes Sister      Social History     Tobacco Use    Smoking status: Never    Smokeless tobacco: Never   Vaping Use    Vaping status: Never Used   Substance Use Topics    Alcohol use: No    Drug use: No     Social History     Social History Narrative        Lifelong non-smoker    Does not drink alcohol     FAMILY AND SOCIAL HISTORY REVIEWED.    Review of Systems  IF PRESENT REFER TO SCANNED ROS SHEET FROM SAME DATE  OTHERWISE ROS OBTAINED AND NON-CONTRIBUTORY OVER HPI.    /82   Pulse 88   Temp 97 °F (36.1 °C) (Infrared)   Ht 167.6 cm (65.98\")   Wt 100 kg (221 lb)   LMP  (LMP Unknown)   SpO2 95%   BMI 35.69 kg/m²   Physical Exam  Vitals and nursing note reviewed.   Constitutional:       General: She is not in acute distress.     Appearance: She is well-developed. She is not diaphoretic.   HENT:      Head: Normocephalic and atraumatic.   Neck:      Thyroid: No thyromegaly.   Cardiovascular:      Rate and Rhythm: Normal rate and regular rhythm.      Heart sounds: Normal heart sounds. No murmur heard.  Pulmonary:      Effort: Pulmonary effort is normal.      Breath sounds: Normal breath sounds. No stridor.   Lymphadenopathy:      Cervical: No cervical adenopathy.      Upper Body:      Right upper body: No " supraclavicular or epitrochlear adenopathy.      Left upper body: No supraclavicular or epitrochlear adenopathy.   Skin:     General: Skin is warm and dry.   Neurological:      Mental Status: She is alert and oriented to person, place, and time.   Psychiatric:         Behavior: Behavior normal.         Results     Immunization History   Administered Date(s) Administered    31-influenza Vac Quardvalent Preservativ 10/13/2017, 10/29/2018    Arexvy (RSV, Adults 60+ yrs) 08/22/2024    Fluzone (or Fluarix & Flulaval for VFC) >6mos 10/08/2019    Fluzone High-Dose 65+YRS 10/27/2020, 09/23/2021, 09/29/2022, 11/02/2023    Hepatitis A 12/10/2018    Pneumococcal Conjugate 13-Valent (PCV13) 09/17/2020    Pneumococcal Polysaccharide (PPSV23) 09/23/2021    Tdap 01/10/2020    Zoster, Unspecified 06/16/2010     Problem List       ICD-10-CM ICD-9-CM   1. Non-smoker  Z78.9 V49.89   2. Chronic persistent asthma  J45.909 493.90   3. Chronic anticoagulation (Coumadin)  Z79.01 V58.61       Discussion     Stable from pulmonary standpoint  No recent acute exacerbation of asthma    She is can remain on Breztri with Tezspire  Albuterol as needed    No evidence of decompensated diastolic heart failure    She remains anticoagulated    I will see her back in 6 months or earlier if there are any problems in the meantime    Moderate level of Medical Decision Making complexity based on 2 or more chronic stable illnesses and an independent review of test results and/or prescription drug management.    Paul Andrew MD  Note electronically signed    CC: Vijaya Wharton APRN

## 2024-11-05 ENCOUNTER — HOSPITAL ENCOUNTER (OUTPATIENT)
Dept: HOSPITAL 22 - ACC | Age: 69
End: 2024-11-05
Payer: MEDICARE

## 2024-11-05 DIAGNOSIS — Z79.01: Primary | ICD-10-CM

## 2024-11-05 DIAGNOSIS — Z86.718: ICD-10-CM

## 2024-11-05 LAB — PHA INR FINGERSTICK: 2.1 (ref 0.9–1.1)

## 2024-11-05 PROCEDURE — 85610 PROTHROMBIN TIME: CPT

## 2024-11-05 PROCEDURE — G0463 HOSPITAL OUTPT CLINIC VISIT: HCPCS

## 2024-11-05 PROCEDURE — 99211 OFF/OP EST MAY X REQ PHY/QHP: CPT

## 2024-11-19 ENCOUNTER — TELEPHONE (OUTPATIENT)
Dept: PULMONOLOGY | Facility: CLINIC | Age: 69
End: 2024-11-19
Payer: MEDICARE

## 2024-11-19 NOTE — TELEPHONE ENCOUNTER
Patient called requesting to stop Tezspire medication. Patient has been experiencing conjunctivitis, fever blisters, acne and sinus issues. JEFF Andrew is out of office, will relay message. Patient verbalized understanding.

## 2024-12-04 ENCOUNTER — HOSPITAL ENCOUNTER (OUTPATIENT)
Dept: HOSPITAL 22 - ACC | Age: 69
End: 2024-12-04
Payer: MEDICARE

## 2024-12-04 DIAGNOSIS — Z86.718: ICD-10-CM

## 2024-12-04 DIAGNOSIS — Z79.01: Primary | ICD-10-CM

## 2024-12-04 LAB — PHA INR FINGERSTICK: 2.2 (ref 0.9–1.1)

## 2024-12-04 PROCEDURE — G0463 HOSPITAL OUTPT CLINIC VISIT: HCPCS

## 2024-12-04 PROCEDURE — 99211 OFF/OP EST MAY X REQ PHY/QHP: CPT

## 2024-12-04 PROCEDURE — 85610 PROTHROMBIN TIME: CPT

## 2024-12-13 ENCOUNTER — HOSPITAL ENCOUNTER (OUTPATIENT)
Dept: HOSPITAL 22 - LAB | Age: 69
Discharge: HOME | End: 2024-12-13
Payer: MEDICARE

## 2024-12-13 DIAGNOSIS — R10.9: ICD-10-CM

## 2024-12-13 DIAGNOSIS — J31.0: ICD-10-CM

## 2024-12-13 DIAGNOSIS — E11.22: Primary | ICD-10-CM

## 2024-12-13 LAB
ALBUMIN LEVEL: 4.2 G/DL (ref 3.5–5)
ALBUMIN/GLOB SERPL: 1.5 {RATIO} (ref 1.1–1.8)
ALP ISO SERPL-ACNC: 52 U/L (ref 38–126)
ALT SERPLBLD-CCNC: 66 U/L (ref 12–78)
ANION GAP SERPL CALC-SCNC: 12.2 MEQ/L (ref 5–15)
AST SERPL QL: 76 U/L (ref 14–36)
BILIRUBIN,TOTAL: 0.5 MG/DL (ref 0.2–1.3)
BUN SERPL-MCNC: 18 MG/DL (ref 7–17)
CALCIUM SPEC-MCNC: 10.1 MG/DL (ref 8.4–10.2)
CHLORIDE SPEC-SCNC: 105 MMOL/L (ref 98–107)
CO2 SERPL-SCNC: 27 MMOL/L (ref 22–30)
CREAT BLD-SCNC: 1 MG/DL (ref 0.52–1.04)
ESTIMATED GLOMERULAR FILT RATE: 55 ML/MIN (ref 60–?)
GFR (AFRICAN AMERICAN): 67 ML/MIN (ref 60–?)
GLOBULIN SER CALC-MCNC: 2.8 G/DL (ref 1.3–3.2)
GLUCOSE: 175 MG/DL (ref 74–100)
HBA1C MFR BLD: 7.4 % (ref 4–6)
HCT VFR BLD CALC: 39.1 % (ref 37–47)
HGB BLD-MCNC: 12.7 G/DL (ref 12.2–16.2)
MCHC RBC-ENTMCNC: 32.4 G/DL (ref 31.8–35.4)
MCV RBC: 82 FL (ref 81–99)
MEAN CORPUSCULAR HEMOGLOBIN: 26.6 PG (ref 27–31.2)
PLATELET # BLD: 238 K/MM3 (ref 142–424)
POTASSIUM: 5.2 MMOL/L (ref 3.5–5.1)
PROT SERPL-MCNC: 7 G/DL (ref 6.3–8.2)
RBC # BLD AUTO: 4.76 M/MM3 (ref 4.2–5.4)
SODIUM SPEC-SCNC: 139 MMOL/L (ref 136–145)
WBC # BLD AUTO: 5 K/MM3 (ref 4.8–10.8)

## 2024-12-13 PROCEDURE — 83036 HEMOGLOBIN GLYCOSYLATED A1C: CPT

## 2024-12-13 PROCEDURE — 80053 COMPREHEN METABOLIC PANEL: CPT

## 2024-12-13 PROCEDURE — 85025 COMPLETE CBC W/AUTO DIFF WBC: CPT

## 2024-12-13 PROCEDURE — 36415 COLL VENOUS BLD VENIPUNCTURE: CPT

## 2024-12-19 ENCOUNTER — HOSPITAL ENCOUNTER (OUTPATIENT)
Dept: HOSPITAL 22 - RAD | Age: 69
Discharge: HOME | End: 2024-12-19
Payer: MEDICARE

## 2024-12-19 DIAGNOSIS — J31.0: Primary | ICD-10-CM

## 2024-12-19 PROCEDURE — 70486 CT MAXILLOFACIAL W/O DYE: CPT

## 2025-01-16 ENCOUNTER — HOSPITAL ENCOUNTER (OUTPATIENT)
Dept: HOSPITAL 22 - ACC | Age: 70
End: 2025-01-16
Payer: MEDICARE

## 2025-01-16 DIAGNOSIS — Z86.718: ICD-10-CM

## 2025-01-16 DIAGNOSIS — Z79.01: Primary | ICD-10-CM

## 2025-01-16 LAB — PHA INR FINGERSTICK: 2.4 (ref 0.9–1.1)

## 2025-01-16 PROCEDURE — 99211 OFF/OP EST MAY X REQ PHY/QHP: CPT

## 2025-01-16 PROCEDURE — 85610 PROTHROMBIN TIME: CPT

## 2025-01-16 PROCEDURE — G0463 HOSPITAL OUTPT CLINIC VISIT: HCPCS

## 2025-01-23 ENCOUNTER — HOSPITAL ENCOUNTER (OUTPATIENT)
Dept: HOSPITAL 22 - RAD | Age: 70
Discharge: HOME | End: 2025-01-23
Payer: MEDICARE

## 2025-01-23 DIAGNOSIS — G89.29: ICD-10-CM

## 2025-01-23 DIAGNOSIS — M54.50: Primary | ICD-10-CM

## 2025-01-23 PROCEDURE — 72114 X-RAY EXAM L-S SPINE BENDING: CPT

## 2025-01-30 ENCOUNTER — HOSPITAL ENCOUNTER (OUTPATIENT)
Dept: HOSPITAL 22 - RAD | Age: 70
Discharge: HOME | End: 2025-01-30
Payer: MEDICARE

## 2025-01-30 DIAGNOSIS — M54.9: Primary | ICD-10-CM

## 2025-01-30 PROCEDURE — 72072 X-RAY EXAM THORAC SPINE 3VWS: CPT

## 2025-02-26 ENCOUNTER — HOSPITAL ENCOUNTER (OUTPATIENT)
Dept: HOSPITAL 22 - PT | Age: 70
Discharge: HOME | End: 2025-02-26
Payer: MEDICARE

## 2025-02-26 DIAGNOSIS — M47.9: Primary | ICD-10-CM

## 2025-02-26 PROCEDURE — 97110 THERAPEUTIC EXERCISES: CPT

## 2025-02-26 PROCEDURE — 97530 THERAPEUTIC ACTIVITIES: CPT

## 2025-02-26 PROCEDURE — G0283 ELEC STIM OTHER THAN WOUND: HCPCS

## 2025-02-26 PROCEDURE — 97014 ELECTRIC STIMULATION THERAPY: CPT

## 2025-02-27 ENCOUNTER — HOSPITAL ENCOUNTER (OUTPATIENT)
Dept: HOSPITAL 22 - ACC | Age: 70
End: 2025-02-27
Payer: MEDICARE

## 2025-02-27 DIAGNOSIS — Z79.01: Primary | ICD-10-CM

## 2025-02-27 DIAGNOSIS — Z86.718: ICD-10-CM

## 2025-02-27 LAB — PHA INR FINGERSTICK: 1.9 (ref 0.9–1.1)

## 2025-02-27 PROCEDURE — 85610 PROTHROMBIN TIME: CPT

## 2025-02-27 PROCEDURE — G0463 HOSPITAL OUTPT CLINIC VISIT: HCPCS

## 2025-02-27 PROCEDURE — 99211 OFF/OP EST MAY X REQ PHY/QHP: CPT

## 2025-03-11 ENCOUNTER — HOSPITAL ENCOUNTER (OUTPATIENT)
Dept: HOSPITAL 22 - PT | Age: 70
Discharge: HOME | End: 2025-03-11
Payer: MEDICARE

## 2025-03-11 DIAGNOSIS — M47.9: Primary | ICD-10-CM

## 2025-03-11 PROCEDURE — 97110 THERAPEUTIC EXERCISES: CPT

## 2025-03-11 PROCEDURE — G0283 ELEC STIM OTHER THAN WOUND: HCPCS

## 2025-03-11 PROCEDURE — 97014 ELECTRIC STIMULATION THERAPY: CPT

## 2025-03-11 PROCEDURE — 97530 THERAPEUTIC ACTIVITIES: CPT

## 2025-04-10 ENCOUNTER — HOSPITAL ENCOUNTER (OUTPATIENT)
Dept: HOSPITAL 22 - ACC | Age: 70
End: 2025-04-10
Payer: MEDICARE

## 2025-04-10 DIAGNOSIS — Z86.718: ICD-10-CM

## 2025-04-10 DIAGNOSIS — Z79.01: Primary | ICD-10-CM

## 2025-04-10 LAB — PHA INR FINGERSTICK: 2.2 (ref 0.9–1.1)

## 2025-04-10 PROCEDURE — 99211 OFF/OP EST MAY X REQ PHY/QHP: CPT

## 2025-04-10 PROCEDURE — G0463 HOSPITAL OUTPT CLINIC VISIT: HCPCS

## 2025-04-10 PROCEDURE — 85610 PROTHROMBIN TIME: CPT

## 2025-05-21 ENCOUNTER — HOSPITAL ENCOUNTER (OUTPATIENT)
Dept: HOSPITAL 22 - ACC | Age: 70
End: 2025-05-21
Payer: MEDICARE

## 2025-05-21 DIAGNOSIS — Z86.718: ICD-10-CM

## 2025-05-21 DIAGNOSIS — Z79.01: Primary | ICD-10-CM

## 2025-05-21 LAB — PHA INR FINGERSTICK: 2.3 (ref 0.9–1.1)

## 2025-05-21 PROCEDURE — G0463 HOSPITAL OUTPT CLINIC VISIT: HCPCS

## 2025-05-21 PROCEDURE — 85610 PROTHROMBIN TIME: CPT

## 2025-05-21 PROCEDURE — 99211 OFF/OP EST MAY X REQ PHY/QHP: CPT

## 2025-06-16 ENCOUNTER — HOSPITAL ENCOUNTER (OUTPATIENT)
Dept: HOSPITAL 22 - LAB | Age: 70
Discharge: HOME | End: 2025-06-16
Payer: MEDICARE

## 2025-06-16 DIAGNOSIS — E55.9: ICD-10-CM

## 2025-06-16 DIAGNOSIS — E78.2: ICD-10-CM

## 2025-06-16 DIAGNOSIS — E53.8: ICD-10-CM

## 2025-06-16 DIAGNOSIS — G25.81: ICD-10-CM

## 2025-06-16 DIAGNOSIS — N18.9: ICD-10-CM

## 2025-06-16 DIAGNOSIS — E11.22: Primary | ICD-10-CM

## 2025-06-16 DIAGNOSIS — Z86.39: ICD-10-CM

## 2025-06-16 LAB
25-OH VITAMIN D, TOTAL: 110 NG/ML (ref 30–100)
ALBUMIN LEVEL: 4.1 G/DL (ref 3.5–5)
ALBUMIN/GLOB SERPL: 1.4 {RATIO} (ref 1.1–1.8)
ALP ISO SERPL-ACNC: 65 U/L (ref 38–126)
ALT SERPLBLD-CCNC: 33 U/L (ref 12–78)
ANION GAP SERPL CALC-SCNC: 10.6 MEQ/L (ref 5–15)
AST SERPL QL: 35 U/L (ref 14–36)
BASOPHILS # BLD AUTO: 0.1 K/MM3 (ref 0–0.2)
BASOPHILS NFR BLD AUTO: 0.8 % (ref 0.1–2)
BILIRUBIN,TOTAL: 0.4 MG/DL (ref 0.2–1.3)
BUN SERPL-MCNC: 13 MG/DL (ref 7–17)
CALCIUM SPEC-MCNC: 9.9 MG/DL (ref 8.4–10.2)
CHLORIDE SPEC-SCNC: 108 MMOL/L (ref 98–107)
CHOLEST SPEC-SCNC: 214 MG/DL (ref 140–200)
CHOLEST/HDLC SERPL: 5.1 {RATIO} (ref 1–3.5)
CO2 SERPL-SCNC: 26 MMOL/L (ref 22–30)
CREAT BLD-SCNC: 1.1 MG/DL (ref 0.52–1.04)
DEPRECATED RDW RBC AUTO: 14.7 % (ref 11.5–17.5)
DIRECT LDL CHOLESTEROL: 107.92 MG/DL (ref 100–129)
EOSINOPHIL # BLD AUTO: 0.8 KMM3 (ref 0–0.4)
EOSINOPHIL NFR BLD AUTO: 12.5 % (ref 0.1–12)
ESTIMATED GLOMERULAR FILT RATE: 49 ML/MIN (ref 60–?)
FERRITIN SERPL-MCNC: 24.1 NG/ML (ref 11.1–264)
GFR (AFRICAN AMERICAN): 60 ML/MIN (ref 60–?)
GLOBULIN SER CALC-MCNC: 2.9 G/DL (ref 1.3–3.2)
GLUCOSE: 137 MG/DL (ref 74–100)
HBA1C MFR BLD: 7.3 % (ref 4–6)
HCT VFR BLD AUTO: 38.8 % (ref 37–47)
HDLC SERPL ELPH-MCNC: 42 MG/DL (ref 40–60)
HGB BLD-MCNC: 12 G/DL (ref 12.2–16.2)
IMM GRANULOCYTES # BLD AUTO: 0.02 10^3UL
IMM GRANULOCYTES NFR BLD AUTO: 0.3 %
IRON SATURATION: 15.71 % (ref 15–55)
IRON SERPL QL: 60 UG/DL (ref 37–170)
LYMPHOCYTES # SPEC AUTO: 1.4 K/MM3 (ref 0.7–4.5)
LYMPHOCYTES %: 21.2 % (ref 10–50)
MAGNESIUM: 1.9 MG/DL (ref 1.6–2.3)
MCH RBC QN AUTO: 26 PG (ref 27–31.2)
MCHC RBC-ENTMCNC: 30.9 G/DL (ref 31.8–35.4)
MCV RBC: 84 FL (ref 81–99)
MONOCYTES # BLD AUTO: 0.6 K/MM3 (ref 0.1–1)
MONOCYTES NFR BLD AUTO: 8.4 % (ref 1.7–9.3)
NEUTROPHILS # BLD AUTO: 3.8 K/MM3 (ref 1.8–7.8)
NEUTROPHILS NFR BLD AUTO: 56.8 % (ref 37–80)
NUCLEATED RED BLOOD CELLS #: 0 10^3/UL
NUCLEATED RED BLOOD CELLS %: 0 %
PLATELET # BLD: 267 K/MM3 (ref 142–424)
PMV BLD AUTO: 10.7 FL (ref 7.4–10.4)
POTASSIUM: 5.6 MMOL/L (ref 3.5–5.1)
PROT SERPL-MCNC: 7 G/DL (ref 6.3–8.2)
RBC # BLD AUTO: 4.62 M/MM3 (ref 4.2–5.4)
RED CELL DISTRIBUTION WIDTH-SD: 45 FL
SODIUM SPEC-SCNC: 139 MMOL/L (ref 136–145)
TOTAL IRON BINDING CAPACITY: 382 UG/DL (ref 265–497)
TRIGLYCERIDES: 152 MG/DL (ref 30–150)
VITAMIN B12: 410 PG/ML (ref 239–931)
VLDLC SERPL-MCNC: 30 MG/DL (ref 0–40)
WBC # BLD AUTO: 6.7 K/MM3 (ref 4.8–10.8)
WBC NRBC COR # BLD: (no result) K/MM3 (ref 4.8–10.8)

## 2025-06-16 PROCEDURE — 82728 ASSAY OF FERRITIN: CPT

## 2025-06-16 PROCEDURE — 85025 COMPLETE CBC W/AUTO DIFF WBC: CPT

## 2025-06-16 PROCEDURE — 82306 VITAMIN D 25 HYDROXY: CPT

## 2025-06-16 PROCEDURE — 36415 COLL VENOUS BLD VENIPUNCTURE: CPT

## 2025-06-16 PROCEDURE — 83550 IRON BINDING TEST: CPT

## 2025-06-16 PROCEDURE — 83540 ASSAY OF IRON: CPT

## 2025-06-16 PROCEDURE — 80053 COMPREHEN METABOLIC PANEL: CPT

## 2025-06-16 PROCEDURE — 80061 LIPID PANEL: CPT

## 2025-06-16 PROCEDURE — 82607 VITAMIN B-12: CPT

## 2025-06-16 PROCEDURE — 83735 ASSAY OF MAGNESIUM: CPT

## 2025-06-16 PROCEDURE — 83036 HEMOGLOBIN GLYCOSYLATED A1C: CPT

## 2025-07-16 ENCOUNTER — HOSPITAL ENCOUNTER (OUTPATIENT)
Dept: HOSPITAL 22 - SC.PAIN | Age: 70
Discharge: HOME | End: 2025-07-16
Payer: MEDICARE

## 2025-07-16 VITALS
DIASTOLIC BLOOD PRESSURE: 63 MMHG | RESPIRATION RATE: 18 BRPM | HEART RATE: 72 BPM | OXYGEN SATURATION: 97 % | SYSTOLIC BLOOD PRESSURE: 124 MMHG

## 2025-07-16 VITALS — BODY MASS INDEX: 32.3 KG/M2

## 2025-07-16 DIAGNOSIS — M51.16: Primary | ICD-10-CM

## 2025-07-16 DIAGNOSIS — M25.561: ICD-10-CM

## 2025-07-16 PROCEDURE — G0463 HOSPITAL OUTPT CLINIC VISIT: HCPCS

## 2025-07-16 PROCEDURE — 99202 OFFICE O/P NEW SF 15 MIN: CPT

## 2025-07-24 ENCOUNTER — HOSPITAL ENCOUNTER (OUTPATIENT)
Dept: HOSPITAL 22 - LAB | Age: 70
Discharge: HOME | End: 2025-07-24
Payer: MEDICARE

## 2025-07-24 DIAGNOSIS — E11.22: Primary | ICD-10-CM

## 2025-07-24 DIAGNOSIS — E67.3: ICD-10-CM

## 2025-07-24 DIAGNOSIS — I10: ICD-10-CM

## 2025-07-24 DIAGNOSIS — E87.5: ICD-10-CM

## 2025-07-24 LAB
25-OH VITAMIN D, TOTAL: 68.7 NG/ML (ref 30–100)
ANION GAP SERPL CALC-SCNC: 11.5 MEQ/L (ref 5–15)
BUN SERPL-MCNC: 13 MG/DL (ref 7–17)
CALCIUM SPEC-MCNC: 9.7 MG/DL (ref 8.4–10.2)
CHLORIDE SPEC-SCNC: 107 MMOL/L (ref 98–107)
CO2 SERPL-SCNC: 26 MMOL/L (ref 22–30)
CREAT BLD-SCNC: 1.1 MG/DL (ref 0.52–1.04)
ESTIMATED GLOMERULAR FILT RATE: 49 ML/MIN (ref 60–?)
GFR (AFRICAN AMERICAN): 60 ML/MIN (ref 60–?)
GLUCOSE: 99 MG/DL (ref 74–100)
POTASSIUM: 5.5 MMOL/L (ref 3.5–5.1)
SODIUM SPEC-SCNC: 139 MMOL/L (ref 136–145)

## 2025-07-24 PROCEDURE — 82306 VITAMIN D 25 HYDROXY: CPT

## 2025-07-24 PROCEDURE — 36415 COLL VENOUS BLD VENIPUNCTURE: CPT

## 2025-07-24 PROCEDURE — 82533 TOTAL CORTISOL: CPT

## 2025-07-24 PROCEDURE — 80048 BASIC METABOLIC PNL TOTAL CA: CPT

## 2025-07-25 LAB — CORTISOL: 14.2 UG/DL (ref 6.2–19.4)

## 2025-07-30 ENCOUNTER — HOSPITAL ENCOUNTER (OUTPATIENT)
Age: 70
Discharge: HOME | End: 2025-07-30
Payer: MEDICARE

## 2025-07-30 VITALS
HEART RATE: 79 BPM | RESPIRATION RATE: 18 BRPM | SYSTOLIC BLOOD PRESSURE: 127 MMHG | OXYGEN SATURATION: 96 % | DIASTOLIC BLOOD PRESSURE: 67 MMHG

## 2025-07-30 DIAGNOSIS — M25.551: Primary | ICD-10-CM

## 2025-07-30 PROCEDURE — 99212 OFFICE O/P EST SF 10 MIN: CPT

## 2025-07-30 PROCEDURE — G0463 HOSPITAL OUTPT CLINIC VISIT: HCPCS
